# Patient Record
Sex: FEMALE | Race: WHITE | NOT HISPANIC OR LATINO | Employment: UNEMPLOYED | URBAN - METROPOLITAN AREA
[De-identification: names, ages, dates, MRNs, and addresses within clinical notes are randomized per-mention and may not be internally consistent; named-entity substitution may affect disease eponyms.]

---

## 2019-03-14 ENCOUNTER — OFFICE VISIT (OUTPATIENT)
Dept: OBSTETRICS AND GYNECOLOGY | Facility: CLINIC | Age: 44
End: 2019-03-14

## 2019-03-14 VITALS
HEART RATE: 74 BPM | HEIGHT: 64 IN | WEIGHT: 176 LBS | DIASTOLIC BLOOD PRESSURE: 73 MMHG | BODY MASS INDEX: 30.05 KG/M2 | SYSTOLIC BLOOD PRESSURE: 112 MMHG

## 2019-03-14 DIAGNOSIS — D25.2 INTRAMURAL AND SUBSEROUS LEIOMYOMA OF UTERUS: ICD-10-CM

## 2019-03-14 DIAGNOSIS — N92.0 MENORRHAGIA WITH REGULAR CYCLE: ICD-10-CM

## 2019-03-14 DIAGNOSIS — N94.6 DYSMENORRHEA: ICD-10-CM

## 2019-03-14 DIAGNOSIS — Z12.4 SCREENING FOR CERVICAL CANCER: ICD-10-CM

## 2019-03-14 DIAGNOSIS — Z71.6 ENCOUNTER FOR TOBACCO USE CESSATION COUNSELING: Primary | ICD-10-CM

## 2019-03-14 DIAGNOSIS — N89.8 VAGINAL DISCHARGE: ICD-10-CM

## 2019-03-14 DIAGNOSIS — D25.1 INTRAMURAL AND SUBSEROUS LEIOMYOMA OF UTERUS: ICD-10-CM

## 2019-03-14 PROCEDURE — 99214 OFFICE O/P EST MOD 30 MIN: CPT | Performed by: OBSTETRICS & GYNECOLOGY

## 2019-03-14 PROCEDURE — 99406 BEHAV CHNG SMOKING 3-10 MIN: CPT | Performed by: OBSTETRICS & GYNECOLOGY

## 2019-03-14 RX ORDER — SODIUM CHLORIDE 0.9 % (FLUSH) 0.9 %
3-10 SYRINGE (ML) INJECTION AS NEEDED
Status: CANCELLED | OUTPATIENT
Start: 2019-03-14

## 2019-03-14 RX ORDER — SODIUM CHLORIDE, SODIUM LACTATE, POTASSIUM CHLORIDE, CALCIUM CHLORIDE 600; 310; 30; 20 MG/100ML; MG/100ML; MG/100ML; MG/100ML
100 INJECTION, SOLUTION INTRAVENOUS CONTINUOUS
Status: CANCELLED | OUTPATIENT
Start: 2019-03-14

## 2019-03-14 RX ORDER — IPRATROPIUM BROMIDE AND ALBUTEROL SULFATE 2.5; .5 MG/3ML; MG/3ML
3 SOLUTION RESPIRATORY (INHALATION) ONCE
Status: CANCELLED | OUTPATIENT
Start: 2019-03-14

## 2019-03-14 RX ORDER — MELOXICAM 7.5 MG/1
TABLET ORAL
COMMUNITY
Start: 2019-02-26 | End: 2019-03-29

## 2019-03-14 RX ORDER — OMEPRAZOLE 40 MG/1
40 CAPSULE, DELAYED RELEASE ORAL DAILY
COMMUNITY
Start: 2019-01-08

## 2019-03-14 RX ORDER — SODIUM CHLORIDE 0.9 % (FLUSH) 0.9 %
3 SYRINGE (ML) INJECTION EVERY 12 HOURS SCHEDULED
Status: CANCELLED | OUTPATIENT
Start: 2019-03-14

## 2019-03-14 RX ORDER — ESCITALOPRAM OXALATE 10 MG/1
10 TABLET ORAL DAILY
COMMUNITY
Start: 2019-01-08 | End: 2021-03-30 | Stop reason: SDUPTHER

## 2019-03-14 RX ORDER — LANOLIN ALCOHOL/MO/W.PET/CERES
1 CREAM (GRAM) TOPICAL DAILY
COMMUNITY
End: 2020-12-22

## 2019-03-14 NOTE — PATIENT INSTRUCTIONS
Abdominal Hysterectomy  Abdominal hysterectomy is a surgical procedure to remove the womb (uterus). The uterus is the muscular organ that houses a developing baby. This surgery may be done if:  · You have cancer.  · You have growths (tumors or fibroids) in the uterus.  · You have long-term (chronic) pain.  · You are bleeding.  · Your uterus has slipped down into your vagina (uterine prolapse).  · You have a condition in which the tissue that lines the uterus grows outside of its normal location (endometriosis).  · You have an infection in your uterus.  · You are having problems with your menstrual cycle.    Depending on why you are having this procedure, you may also have other reproductive organs removed. These could include:  · The part of your vagina that connects with your uterus (cervix).  · The organs that make eggs (ovaries).  · The tubes that connect the ovaries to the uterus (fallopian tubes).    Tell a health care provider about:  · Any allergies you have.  · All medicines you are taking, including vitamins, herbs, eye drops, creams, and over-the-counter medicines.  · Any problems you or family members have had with anesthetic medicines.  · Any blood disorders you have.  · Any surgeries you have had.  · Any medical conditions you have.  · Whether you are pregnant or may be pregnant.  What are the risks?  Generally, this is a safe procedure. However, problems may occur, including:  · Bleeding.  · Infection.  · Allergic reactions to medicines or dyes.  · Damage to other structures or organs.  · Nerve injury.  · Decreased interest in sex or pain during sex.  · Blood clots that can break free and travel to your lungs.    What happens before the procedure?  Staying hydrated  Follow instructions from your health care provider about hydration, which may include:  · Up to 2 hours before the procedure - you may continue to drink clear liquids, such as water, clear fruit juice, black coffee, and plain tea    Eating  and drinking restrictions  Follow instructions from your health care provider about eating and drinking, which may include:  · 8 hours before the procedure - stop eating heavy meals or foods such as meat, fried foods, or fatty foods.  · 6 hours before the procedure - stop eating light meals or foods, such as toast or cereal.  · 6 hours before the procedure - stop drinking milk or drinks that contain milk.  · 2 hours before the procedure - stop drinking clear liquids.    Medicines  · Ask your health care provider about:  ? Changing or stopping your regular medicines. This is especially important if you are taking diabetes medicines or blood thinners.  ? Taking medicines such as aspirin and ibuprofen. These medicines can thin your blood. Do not take these medicines before your procedure if your health care provider instructs you not to.  · You may be given antibiotic medicine to help prevent infection. Take it as told by your health care provider.  · You may be asked to take laxatives to prevent constipation.  General instructions  · Ask your health care provider how your surgical site will be marked or identified.  · You may be asked to shower with a germ-killing soap.  · Plan to have someone take you home from the hospital.  · Do not use any products that contain nicotine or tobacco, such as cigarettes and e-cigarettes. If you need help quitting, ask your health care provider.  · You may have an exam or testing.  · You may have a blood or urine sample taken.  · You may need to have an enema to clean out your rectum and lower colon.  · This procedure can affect the way you feel about yourself. Talk to your health care provider about the physical and emotional changes this procedure may cause.  What happens during the procedure?  · To lower your risk of infection:  ? Your health care team will wash or sanitize their hands.  ? Your skin will be washed with soap.  ? Hair may be removed from the surgical area.  · An IV  tube will be inserted into one of your veins.  · You will be given one or more of the following:  ? A medicine to help you relax (sedative).  ? A medicine to make you fall asleep (general anesthetic).  · Tight-fitting (compression) stockings will be placed on your legs to promote circulation.  · A thin, flexible tube (catheter) will be inserted to help drain your urine.  · The surgeon will make a cut (incision) through the skin in your lower belly. The incision may go side-to-side or up-and-down.  · The surgeon will move aside the body tissue that covers your uterus. The surgeon will then carefully take out your uterus along with any of the other organs that need to be removed.  · Bleeding will be controlled with clamps or sutures.  · The surgeon will close your incision with stitches (sutures), skin glue, or adhesive strips.  · A bandage (dressing) will be placed over the incision.  The procedure may vary among health care providers and hospitals.  What happens after the procedure?  · You will be given pain medicine as needed.  · Your blood pressure, heart rate, breathing rate, and blood oxygen level will be monitored until the medicines you were given have worn off.  · You will need to stay in the hospital to recover for one to two days. Ask your health care provider how long you will need to stay in the hospital after your procedure.  · You may have a liquid diet at first. You will most likely return to your usual diet the day after surgery.  · You will still have the urinary catheter in place. It will likely be removed the day after surgery.  · You may have to wear compression stockings. These stockings help to prevent blood clots and reduce swelling in your legs.  · You will be encouraged to walk as soon as possible. You will also use a device or do breathing exercises to keep your lungs clear.  · You may need to use a sanitary napkin for vaginal discharge.  Summary  · Abdominal hysterectomy is a surgical  procedure to remove the womb (uterus). The uterus is the muscular organ that houses a developing baby.  · This procedure can affect the way you feel about yourself. Talk to your health care provider about the physical and emotional changes this procedure may cause.  · You will be given medicines for pain after the procedure.  · You will need to stay in the hospital to recover. Ask your health care provider how long you will need to stay in the hospital after your procedure.  This information is not intended to replace advice given to you by your health care provider. Make sure you discuss any questions you have with your health care provider.  Document Released: 12/23/2014 Document Revised: 12/06/2017 Document Reviewed: 12/06/2017  [employer] Interactive Patient Education © 2019 [employer]    Abdominal Hysterectomy, Care After  This sheet gives you information about how to care for yourself after your procedure. Your doctor may also give you more specific instructions. If you have problems or questions, contact your doctor.  Follow these instructions at home:  Bathing  · Do not take baths, swim, or use a hot tub until your doctor says it is okay. Ask your doctor if you can take showers. You may only be allowed to take sponge baths for bathing.  · Keep the bandage (dressing) dry until your doctor says it can be taken off.  Surgical cut (  incision) care  · Follow instructions from your doctor about how to take care of your cut from surgery. Make sure you:  ? Wash your hands with soap and water before you change your bandage (dressing). If you cannot use soap and water, use hand .  ? Change your bandage as told by your doctor.  ? Leave stitches (sutures), skin glue, or skin tape (adhesive) strips in place. They may need to stay in place for 2 weeks or longer. If tape strips get loose and curl up, you may trim the loose edges. Do not remove tape strips completely unless your doctor says it is okay.  · Check  your surgical cut area every day for signs of infection. Check for:  ? Redness, swelling, or pain.  ? Fluid or blood.  ? Warmth.  ? Pus or a bad smell.  Activity  · Do gentle, daily exercise as told by your doctor. You may be told to take short walks every day and go farther each time.  · Do not lift anything that is heavier than 10 lb (4.5 kg), or the limit that your doctor tells you, until he or she says that it is safe.  · Do not drive or use heavy machinery while taking prescription pain medicine.  · Do not drive for 24 hours if you were given a medicine to help you relax (sedative).  · Follow your doctor's advice about exercise, driving, and general activities. Ask your doctor what activities are safe for you.  Lifestyle  · Do not douche, use tampons, or have sex for at least 6 weeks or as told by your doctor.  · Do not drink alcohol until your doctor says it is okay.  · Drink enough fluid to keep your pee (urine) clear or pale yellow.  · Try to have someone at home with you for the first 1-2 weeks to help.  · Do not use any products that contain nicotine or tobacco, such as cigarettes and e-cigarettes. These can slow down healing. If you need help quitting, ask your doctor.  General instructions  · Take over-the-counter and prescription medicines only as told by your doctor.  · Do not take aspirin or ibuprofen. These medicines can cause bleeding.  · To prevent or treat constipation while you are taking prescription pain medicine, your doctor may suggest that you:  ? Drink enough fluid to keep your urine clear or pale yellow.  ? Take over-the-counter or prescription medicines.  ? Eat foods that are high in fiber, such as:  § Fresh fruits and vegetables.  § Whole grains.  § Beans.  ? Limit foods that are high in fat and processed sugars, such as fried and sweet foods.  · Keep all follow-up visits as told by your doctor. This is important.  Contact a doctor if:  · You have chills or fever.  · You have redness,  swelling, or pain around your cut.  · You have fluid or blood coming from your cut.  · Your cut feels warm to the touch.  · You have pus or a bad smell coming from your cut.  · Your cut breaks open.  · You feel dizzy or light-headed.  · You have pain or bleeding when you pee.  · You keep having watery poop (diarrhea).  · You keep feeling sick to your stomach (nauseous) or keep throwing up (vomiting).  · You have unusual fluid (discharge) coming from your vagina.  · You have a rash.  · You have a reaction to your medicine.  · Your pain medicine does not help.  Get help right away if:  · You have a fever and your symptoms get worse all of a sudden.  · You have very bad belly (abdominal) pain.  · You are short of breath.  · You pass out (faint).  · You have pain, swelling, or redness of your leg.  · You bleed a lot from your vagina and notice clumps of blood (clots).  Summary  · Do not take baths, swim, or use a hot tub until your doctor says it is okay. Ask your doctor if you can take showers. You may only be allowed to take sponge baths for bathing.  · Follow your doctor's advice about exercise, driving, and general activities. Ask your doctor what activities are safe for you.  · Do not lift anything that is heavier than 10 lb (4.5 kg), or the limit that your doctor tells you, until he or she says that it is safe.  · Try to have someone at home with you for the first 1-2 weeks to help.  This information is not intended to replace advice given to you by your health care provider. Make sure you discuss any questions you have with your health care provider.  Document Released: 09/26/2009 Document Revised: 12/06/2017 Document Reviewed: 12/06/2017  [employer] Interactive Patient Education © 2019 [employer]    For more information:    Quit Now [facility]  1-800-QUIT-NOW  https://[place].quitlogix.org/en-[place]/  Steps to Quit Smoking  Smoking tobacco can be harmful to your health and can affect almost every organ in your body.  Smoking puts you, and those around you, at risk for developing many serious chronic diseases. Quitting smoking is difficult, but it is one of the best things that you can do for your health. It is never too late to quit.  What are the benefits of quitting smoking?  When you quit smoking, you lower your risk of developing serious diseases and conditions, such as:  · Lung cancer or lung disease, such as COPD.  · Heart disease.  · Stroke.  · Heart attack.  · Infertility.  · Osteoporosis and bone fractures.  Additionally, symptoms such as coughing, wheezing, and shortness of breath may get better when you quit. You may also find that you get sick less often because your body is stronger at fighting off colds and infections. If you are pregnant, quitting smoking can help to reduce your chances of having a baby of low birth weight.  How do I get ready to quit?  When you decide to quit smoking, create a plan to make sure that you are successful. Before you quit:  · Pick a date to quit. Set a date within the next two weeks to give you time to prepare.  · Write down the reasons why you are quitting. Keep this list in places where you will see it often, such as on your bathroom mirror or in your car or wallet.  · Identify the people, places, things, and activities that make you want to smoke (triggers) and avoid them. Make sure to take these actions:  ¨ Throw away all cigarettes at home, at work, and in your car.  ¨ Throw away smoking accessories, such as ashtrays and lighters.  ¨ Clean your car and make sure to empty the ashtray.  ¨ Clean your home, including curtains and carpets.  · Tell your family, friends, and coworkers that you are quitting. Support from your loved ones can make quitting easier.  · Talk with your health care provider about your options for quitting smoking.  · Find out what treatment options are covered by your health insurance.  What strategies can I use to quit smoking?  Talk with your healthcare  provider about different strategies to quit smoking. Some strategies include:  · Quitting smoking altogether instead of gradually lessening how much you smoke over a period of time. Research shows that quitting “cold turkey” is more successful than gradually quitting.  · Attending in-person counseling to help you build problem-solving skills. You are more likely to have success in quitting if you attend several counseling sessions. Even short sessions of 10 minutes can be effective.  · Finding resources and support systems that can help you to quit smoking and remain smoke-free after you quit. These resources are most helpful when you use them often. They can include:  ¨ Online chats with a counselor.  ¨ Telephone quitlines.  ¨ Printed self-help materials.  ¨ Support groups or group counseling.  ¨ Text messaging programs.  ¨ Mobile phone applications.  · Taking medicines to help you quit smoking. (If you are pregnant or breastfeeding, talk with your health care provider first.) Some medicines contain nicotine and some do not. Both types of medicines help with cravings, but the medicines that include nicotine help to relieve withdrawal symptoms. Your health care provider may recommend:  ¨ Nicotine patches, gum, or lozenges.  ¨ Nicotine inhalers or sprays.  ¨ Non-nicotine medicine that is taken by mouth.  Talk with your health care provider about combining strategies, such as taking medicines while you are also receiving in-person counseling. Using these two strategies together makes you more likely to succeed in quitting than if you used either strategy on its own.  If you are pregnant or breastfeeding, talk with your health care provider about finding counseling or other support strategies to quit smoking. Do not take medicine to help you quit smoking unless told to do so by your health care provider.  What things can I do to make it easier to quit?  Quitting smoking might feel overwhelming at first, but there is a  lot that you can do to make it easier. Take these important actions:  · Reach out to your family and friends and ask that they support and encourage you during this time. Call telephone quitlines, reach out to support groups, or work with a counselor for support.  · Ask people who smoke to avoid smoking around you.  · Avoid places that trigger you to smoke, such as bars, parties, or smoke-break areas at work.  · Spend time around people who do not smoke.  · Lessen stress in your life, because stress can be a smoking trigger for some people. To lessen stress, try:  ¨ Exercising regularly.  ¨ Deep-breathing exercises.  ¨ Yoga.  ¨ Meditating.  ¨ Performing a body scan. This involves closing your eyes, scanning your body from head to toe, and noticing which parts of your body are particularly tense. Purposefully relax the muscles in those areas.  · Download or purchase mobile phone or tablet apps (applications) that can help you stick to your quit plan by providing reminders, tips, and encouragement. There are many free apps, such as QuitGuide from the CDC (Centers for Disease Control and Prevention). You can find other support for quitting smoking (smoking cessation) through smokefree.gov and other websites.  How will I feel when I quit smoking?  Within the first 24 hours of quitting smoking, you may start to feel some withdrawal symptoms. These symptoms are usually most noticeable 2-3 days after quitting, but they usually do not last beyond 2-3 weeks. Changes or symptoms that you might experience include:  · Mood swings.  · Restlessness, anxiety, or irritation.  · Difficulty concentrating.  · Dizziness.  · Strong cravings for sugary foods in addition to nicotine.  · Mild weight gain.  · Constipation.  · Nausea.  · Coughing or a sore throat.  · Changes in how your medicines work in your body.  · A depressed mood.  · Difficulty sleeping (insomnia).  After the first 2-3 weeks of quitting, you may start to notice more  positive results, such as:  · Improved sense of smell and taste.  · Decreased coughing and sore throat.  · Slower heart rate.  · Lower blood pressure.  · Clearer skin.  · The ability to breathe more easily.  · Fewer sick days.  Quitting smoking is very challenging for most people. Do not get discouraged if you are not successful the first time. Some people need to make many attempts to quit before they achieve long-term success. Do your best to stick to your quit plan, and talk with your health care provider if you have any questions or concerns.  This information is not intended to replace advice given to you by your health care provider. Make sure you discuss any questions you have with your health care provider.  Document Released: 12/12/2002 Document Revised: 08/15/2017 Document Reviewed: 05/03/2016  [employer] Interactive Patient Education © 2017 [employer]

## 2019-03-14 NOTE — PROGRESS NOTES
Subjective   Candi Valerio is a 43 y.o. female.   CC: pt here for fibroids.   History of Present Illness   Patient last saw me about 3 years ago.  At the time she was noted to have significant uterine fibroids.  She had a dominant fibroid that was at least 7 cm in size at that time.  Patient was having at that time mostly symptoms with stress urinary incontinence.  Patient was referred to urogynecology who had recommended proceeding with hysterectomy and mid urethral sling placement.  It seems that unfortunately her insurance company would not cover the hysterectomy at the time, so she had only the mid urethral sling placement.  She reports that initially her stress urinary incontinence was improved, but she is now having issues with leakage of urine again.  Her biggest concern however at this time is with painful and heavy periods.  She reports that her periods last 5 days.  She says they are extremely heavy the first 2-3 days.  She reports that she goes through about 10 pads per day.  She says the pain during her menses is excruciating.  Recently, she was also having some left-sided back and flank pain for which she went to the emergency room.  CT scan of the time only noted a markedly enlarged fibroid uterus.  She has not had a recent ultrasound.  Her last Pap smear was 3 years ago and was normal.  The patient wishes to move forward with definitive surgical therapy again with hysterectomy, this time more because of her painful and heavy periods.    Past Surgical History:   Procedure Laterality Date   • D&C WITH SUCTION     • INCONTINENCE SURGERY     • TUBAL ABDOMINAL LIGATION       Obstetric History       T0      L3     SAB1   TAB0   Ectopic0   Molar0   Multiple0   Live Births0       # Outcome Date GA Lbr Israel/2nd Weight Sex Delivery Anes PTL Lv   4 SAB            3   35w0d   F       2   35w0d   F       1   35w0d   M Vag-Spont             Past Medical History:   Diagnosis Date   •  "Abnormal Pap smear of cervix    • Anxiety      Current Outpatient Medications on File Prior to Visit   Medication Sig   • Diclofenac Sodium (PENNSAID) 2 % solution Place  on the skin as directed by provider.   • escitalopram (LEXAPRO) 10 MG tablet Take 10 mg by mouth Daily.   • omeprazole (priLOSEC) 40 MG capsule Take 40 mg by mouth Daily.   • Biotin 300 MCG tablet Take  by mouth.   • meloxicam (MOBIC) 7.5 MG tablet      No current facility-administered medications on file prior to visit.      Allergies   Allergen Reactions   • Codeine Swelling   • Adhesive Tape Rash       The following portions of the patient's history were reviewed and updated as appropriate: allergies, current medications, past family history, past medical history, past social history, past surgical history and problem list.    Review of Systems  General: No fever or chills  Constitutional: No weight loss or gain, no hair loss  HENT: No headache, no hearing loss, no tinnitus  Eyes: normal vision, no eye pain  Lungs: No cough, no shortness of breath  Heart: No chest pain, no palpitations  Abdomen: No nausea, vomiting, constipation or diarrhea  : No dysuria, no hematuria  Skin: No rashes  Lymph: No swelling  Neuro: No parathesia, no weakness  Psych: Normal though content, no hallucinations, no SI/HI    Objective   Physical Exam  Vitals:    03/14/19 0810   BP: 112/73   Pulse: 74   Weight: 79.8 kg (176 lb)   Height: 162.6 cm (64\")     Patient's last menstrual period was 02/25/2019 (approximate).    Gen: No acute distress, awake and oriented times three  Abdomen: soft, nontender, no masses or hernia, no hepatosplenomegaly, non distended, normoactive bowel sounds  Pelvic: Exam performed in the presence of a female chaperone  Patient has provided verbal consent to proceed with exam.  Normal external female genitalia, no lesions  Urethra: Normal meatus, no caruncle  Bladder: nontender  Vagina: No blood; moderate amount thin white DC  Cervix: No " cervical motion tenderness, no lesions, no active bleeding, nonfriable  Uterus: Anteverted, markedly enlarged about 16-18 weeks size, mildly tender, irregular borders consistent with fibroid uterus  Adnexa: No adnexal tenderness, but evaluation for adnexal masses is limited due to the size of the massively enlarged uterus  External anal exam: Normal appearance, no lesions or hemorrhoids  Rectal: Deferred  Psych: Good judgement and insight, normal affect and mood        Assessment/Plan   Diagnoses and all orders for this visit:    Encounter for tobacco use cessation counseling  I advised Candi of the risks of continuing to use tobacco, and I provided her with tobacco cessation educational materials in the After Visit Summary.  We discussed the risks of continuing to smoke in the perioperative setting including increased risk of surgical complications and postoperative complications such as wound infections, wound breakdown, DVT, pneumonia, anesthesia comp occasions, etc.    During this visit, I spent greater than 3 minutes counseling the patient regarding tobacco cessation.  Screening for cervical cancer  -     IGP, Apt HPV,rfx 16 / 18,45  Patient is due for a Pap smear and especially since she is planning hysterectomy she needs to have this done.  Pap smear performed today.  Vaginal discharge  -     NuSwab VG+ - Swab, Vagina    Menorrhagia with regular cycle    Dysmenorrhea    Intramural and subserous leiomyoma of uterus  -     Case Request; Standing  -     CBC (No Diff); Future  -     hCG, Serum, QUALITATIVE; Future  -     XR Chest 2 View; Future  -     lactated ringers infusion; Infuse 100 mL/hr into a venous catheter Continuous.  -     ceFAZolin (ANCEF) 2 g in sodium chloride 0.9 % 100 mL IVPB; Infuse 2 g into a venous catheter 1 (One) Time.  -     sodium chloride 0.9 % flush 3 mL; Infuse 3 mL into a venous catheter Every 12 (Twelve) Hours.  -     sodium chloride 0.9 % flush 3-10 mL; Infuse 3-10 mL into a venous  catheter As Needed for Line Care.  -     ipratropium-albuterol (DUO-NEB) nebulizer solution 3 mL; Take 3 mL by nebulization 1 (One) Time.  -     Case Request    Other orders  -     Follow Anesthesia Guidelines / Standing Orders; Future  -     Obtain Informed Consent; Future  -     Provide NPO Instructions to Patient; Future  -     Follow Anesthesia Guidelines / Standing Orders; Standing  -     Verify / Perform Chlorhexidine Skin Prep if Indicated (If Not Already Completed); Standing  -     Inpatient Admission; Standing  -     Chlorhexidine Skin Prep; Future  -     Verify NPO Status; Standing  -     SCD (Sequential Compression Device) - Place on Patient in Pre-Op; Standing  -     Instructions on Coughing, Deep Breathing & Incentive Spirometry; Standing  -     Type & Screen; Standing  -     Insert Peripheral IV; Standing  -     Saline Lock & Maintain IV Access; Standing    Patient is a markedly enlarged fibroid uterus on exam today.  She reports symptoms secondary to these fibroids including heavy periods and extremely painful periods.  She has been taking NSAIDs with little relief in her symptoms.  I feel that her fibroids are advanced to the point that medical therapy would be unlikely to be effective in this patient.  Furthermore, the size of her uterus is greater than that which can accommodate an endometrial ablation.  As such, I feel her only reasonable course is to proceed with hysterectomy.  The risks, benefits, and alternatives were discussed with the patient.  This would likely need to be an open/abdominal approach given the size of her uterus.  Typical recovery process is discussed with the patient.  She verbalizes understanding and wishes to proceed with hysterectomy.  We will try to schedule for 4/17/19.  She should return in 2 weeks for preoperative appointment

## 2019-03-18 ENCOUNTER — TELEPHONE (OUTPATIENT)
Dept: OBSTETRICS AND GYNECOLOGY | Facility: CLINIC | Age: 44
End: 2019-03-18

## 2019-03-18 DIAGNOSIS — N76.0 BACTERIAL VAGINOSIS: Primary | ICD-10-CM

## 2019-03-18 DIAGNOSIS — B96.89 BACTERIAL VAGINOSIS: Primary | ICD-10-CM

## 2019-03-18 LAB
A VAGINAE DNA VAG QL NAA+PROBE: ABNORMAL SCORE
BVAB2 DNA VAG QL NAA+PROBE: ABNORMAL SCORE
C ALBICANS DNA VAG QL NAA+PROBE: NEGATIVE
C GLABRATA DNA VAG QL NAA+PROBE: NEGATIVE
C TRACH RRNA SPEC QL NAA+PROBE: NEGATIVE
CYTOLOGIST CVX/VAG CYTO: NORMAL
CYTOLOGY CVX/VAG DOC THIN PREP: NORMAL
DX ICD CODE: NORMAL
HIV 1 & 2 AB SER-IMP: NORMAL
HPV I/H RISK 4 DNA CVX QL PROBE+SIG AMP: NEGATIVE
MEGA1 DNA VAG QL NAA+PROBE: ABNORMAL SCORE
N GONORRHOEA RRNA SPEC QL NAA+PROBE: NEGATIVE
OTHER STN SPEC: NORMAL
PATH REPORT.FINAL DX SPEC: NORMAL
STAT OF ADQ CVX/VAG CYTO-IMP: NORMAL
T VAGINALIS RRNA SPEC QL NAA+PROBE: NEGATIVE

## 2019-03-18 RX ORDER — METRONIDAZOLE 500 MG/1
500 TABLET ORAL 2 TIMES DAILY
Qty: 14 TABLET | Refills: 0 | Status: SHIPPED | OUTPATIENT
Start: 2019-03-18 | End: 2019-03-25

## 2019-03-18 NOTE — TELEPHONE ENCOUNTER
Pt aware. MARJAN      ----- Message from Curtis Ventura MD sent at 3/18/2019  2:34 PM EDT -----  Let the patient know that her cultures revealed bacterial vaginosis.  I sent in a prescription for metronidazole.  She should avoid alcohol while taking this medication.  You can also let her know that her Pap smear was normal

## 2019-03-19 ENCOUNTER — RESULTS ENCOUNTER (OUTPATIENT)
Dept: OBSTETRICS AND GYNECOLOGY | Facility: CLINIC | Age: 44
End: 2019-03-19

## 2019-03-19 DIAGNOSIS — D25.1 INTRAMURAL AND SUBSEROUS LEIOMYOMA OF UTERUS: ICD-10-CM

## 2019-03-19 DIAGNOSIS — D25.2 INTRAMURAL AND SUBSEROUS LEIOMYOMA OF UTERUS: ICD-10-CM

## 2019-03-29 ENCOUNTER — OFFICE VISIT (OUTPATIENT)
Dept: OBSTETRICS AND GYNECOLOGY | Facility: CLINIC | Age: 44
End: 2019-03-29

## 2019-03-29 VITALS
HEART RATE: 87 BPM | BODY MASS INDEX: 29.71 KG/M2 | WEIGHT: 174 LBS | SYSTOLIC BLOOD PRESSURE: 119 MMHG | HEIGHT: 64 IN | DIASTOLIC BLOOD PRESSURE: 74 MMHG

## 2019-03-29 DIAGNOSIS — D25.1 INTRAMURAL AND SUBSEROUS LEIOMYOMA OF UTERUS: Primary | ICD-10-CM

## 2019-03-29 DIAGNOSIS — N94.6 DYSMENORRHEA: ICD-10-CM

## 2019-03-29 DIAGNOSIS — D25.2 INTRAMURAL AND SUBSEROUS LEIOMYOMA OF UTERUS: Primary | ICD-10-CM

## 2019-03-29 DIAGNOSIS — N92.0 MENORRHAGIA WITH REGULAR CYCLE: ICD-10-CM

## 2019-03-29 PROBLEM — R10.32 COLICKY LLQ ABDOMINAL PAIN: Status: ACTIVE | Noted: 2019-03-29

## 2019-03-29 PROCEDURE — 99214 OFFICE O/P EST MOD 30 MIN: CPT | Performed by: OBSTETRICS & GYNECOLOGY

## 2019-03-29 NOTE — PROGRESS NOTES
Subjective   Candi Valerio is a 43 y.o. female.   Cc: pt here for pre-op.   History of Present Illness   Patient is here for preop for planned total abdominal hysterectomy.  She has large bulky fibroids.  She had initially been evaluated for hysterectomy about 3 years ago, but the procedure was not approved by her insurance company.  She did end up having mid urethral sling for stress urinary incontinence which has helped some, but she still has episodes of stress incontinence.  She reports periods that are very heavy and painful.  She would like to proceed now with definitive surgical management with hysterectomy.  Patient is still smoking, which she states she is trying to quit at least 2 weeks before her surgery.      Current Outpatient Medications:   •  Biotin 300 MCG tablet, Take  by mouth., Disp: , Rfl:   •  Diclofenac Sodium (PENNSAID) 2 % solution, Place  on the skin as directed by provider., Disp: , Rfl:   •  escitalopram (LEXAPRO) 10 MG tablet, Take 10 mg by mouth Daily., Disp: , Rfl:   •  omeprazole (priLOSEC) 40 MG capsule, Take 40 mg by mouth Daily., Disp: , Rfl:     Allergies   Allergen Reactions   • Codeine Swelling   • Adhesive Tape Rash       Past Surgical History:   Procedure Laterality Date   • D&C WITH SUCTION     • INCONTINENCE SURGERY     • TUBAL ABDOMINAL LIGATION       Past Medical History:   Diagnosis Date   • Abnormal Pap smear of cervix    • Anxiety        The following portions of the patient's history were reviewed and updated as appropriate: allergies, current medications, past family history, past medical history, past social history, past surgical history and problem list.    Review of Systems  General: No fever or chills  Constitutional: No weight loss or gain, no hair loss  HENT: No headache, no hearing loss, no tinnitus  Eyes: normal vision, no eye pain  Lungs: No cough, no shortness of breath  Heart: No chest pain, no palpitations  Abdomen: No nausea, vomiting, constipation or  "diarrhea  : No dysuria, no hematuria  Skin: No rashes  Lymph: No swelling  Neuro: No parathesia, no weakness  Psych: Normal though content, no hallucinations, no SI/HI      Objective   Physical Exam  Vitals:    03/29/19 0846   BP: 119/74   Pulse: 87   Weight: 78.9 kg (174 lb)   Height: 162.6 cm (64\")       General: No acute distress, awake and oriented x3  Abdomen: Soft, nontender, nondistended  Psychiatric: Good judgment and insight, normal affect and mood  Neurological cranial nerves II through XII intact, no deficits        Assessment/Plan   Diagnoses and all orders for this visit:    Intramural and subserous leiomyoma of uterus    Dysmenorrhea    Menorrhagia with regular cycle      Patient with grossly enlarged uterus with significant size fibroids.  On exam it is roughly 16-18 weeks in size and bulky.  Size of the uterus precludes laparoscopic approach.  Patient will need open approach.  Surgical risks were discussed with the patient at length today.  Preoperative instructions are given to the patient.  Typical recovery process is explained.  Patient will still preop in the hospital on 4/12.  Discussed plans for surgery today.  Surgical consent signed today.  All questions answered.    I spent 22 out of 25 minutes with the patient in face to face counseling of the above issues.           "

## 2019-04-12 ENCOUNTER — HOSPITAL ENCOUNTER (OUTPATIENT)
Dept: GENERAL RADIOLOGY | Facility: HOSPITAL | Age: 44
Discharge: HOME | End: 2019-04-12
Admitting: OBSTETRICS & GYNECOLOGY

## 2019-04-12 ENCOUNTER — APPOINTMENT (OUTPATIENT)
Dept: PREADMISSION TESTING | Facility: HOSPITAL | Age: 44
End: 2019-04-12

## 2019-04-12 VITALS
TEMPERATURE: 98.5 F | RESPIRATION RATE: 20 BRPM | OXYGEN SATURATION: 97 % | BODY MASS INDEX: 29.88 KG/M2 | SYSTOLIC BLOOD PRESSURE: 121 MMHG | HEART RATE: 82 BPM | WEIGHT: 175 LBS | DIASTOLIC BLOOD PRESSURE: 69 MMHG | HEIGHT: 64 IN

## 2019-04-12 DIAGNOSIS — D25.2 INTRAMURAL AND SUBSEROUS LEIOMYOMA OF UTERUS: ICD-10-CM

## 2019-04-12 DIAGNOSIS — D25.1 INTRAMURAL AND SUBSEROUS LEIOMYOMA OF UTERUS: ICD-10-CM

## 2019-04-12 LAB
ANION GAP SERPL CALCULATED.3IONS-SCNC: 12.8 MMOL/L
BUN BLD-MCNC: 11 MG/DL (ref 6–20)
BUN/CREAT SERPL: 14.1 (ref 7–25)
CALCIUM SPEC-SCNC: 9.7 MG/DL (ref 8.6–10.5)
CHLORIDE SERPL-SCNC: 102 MMOL/L (ref 98–107)
CO2 SERPL-SCNC: 25.2 MMOL/L (ref 22–29)
CREAT BLD-MCNC: 0.78 MG/DL (ref 0.57–1)
DEPRECATED RDW RBC AUTO: 49.3 FL (ref 37–54)
ERYTHROCYTE [DISTWIDTH] IN BLOOD BY AUTOMATED COUNT: 13.4 % (ref 12.3–15.4)
GFR SERPL CREATININE-BSD FRML MDRD: 81 ML/MIN/1.73
GLUCOSE BLD-MCNC: 102 MG/DL (ref 65–99)
HCG SERPL QL: NEGATIVE
HCT VFR BLD AUTO: 42.4 % (ref 34–46.6)
HGB BLD-MCNC: 13.5 G/DL (ref 12–15.9)
MCH RBC QN AUTO: 31.3 PG (ref 26.6–33)
MCHC RBC AUTO-ENTMCNC: 31.8 G/DL (ref 31.5–35.7)
MCV RBC AUTO: 98.4 FL (ref 79–97)
PLATELET # BLD AUTO: 314 10*3/MM3 (ref 140–450)
PMV BLD AUTO: 10.9 FL (ref 6–12)
POTASSIUM BLD-SCNC: 4.8 MMOL/L (ref 3.5–5.2)
RBC # BLD AUTO: 4.31 10*6/MM3 (ref 3.77–5.28)
SODIUM BLD-SCNC: 140 MMOL/L (ref 136–145)
WBC NRBC COR # BLD: 8.69 10*3/MM3 (ref 3.4–10.8)

## 2019-04-12 PROCEDURE — 93010 ELECTROCARDIOGRAM REPORT: CPT | Performed by: INTERNAL MEDICINE

## 2019-04-12 PROCEDURE — 85027 COMPLETE CBC AUTOMATED: CPT | Performed by: OBSTETRICS & GYNECOLOGY

## 2019-04-12 PROCEDURE — 80048 BASIC METABOLIC PNL TOTAL CA: CPT | Performed by: OBSTETRICS & GYNECOLOGY

## 2019-04-12 PROCEDURE — 84703 CHORIONIC GONADOTROPIN ASSAY: CPT | Performed by: OBSTETRICS & GYNECOLOGY

## 2019-04-12 PROCEDURE — 71046 X-RAY EXAM CHEST 2 VIEWS: CPT

## 2019-04-12 PROCEDURE — 36415 COLL VENOUS BLD VENIPUNCTURE: CPT | Performed by: OBSTETRICS & GYNECOLOGY

## 2019-04-12 PROCEDURE — 93005 ELECTROCARDIOGRAM TRACING: CPT

## 2019-04-12 RX ORDER — LOTEPREDNOL ETABONATE 5 MG/ML
4 SUSPENSION/ DROPS OPHTHALMIC DAILY
COMMUNITY
End: 2019-05-16

## 2019-04-12 NOTE — DISCHARGE INSTRUCTIONS
Take the following medications the morning of surgery with a small sip of water:        General Instructions:  • Do not eat solid food after midnight the night before surgery.  • You may drink clear liquids day of surgery but must stop at least one hour before your hospital arrival time.  • It is beneficial for you to have a clear drink that contains carbohydrates the day of surgery.  We suggest a 12 to 20 ounce bottle of Gatorade or Powerade for non-diabetic patients or a 12 to 20 ounce bottle of G2 or Powerade Zero for diabetic patients. (Pediatric patients, are not advised to drink a 12 to 20 ounce carbohydrate drink)    Clear liquids are liquids you can see through.  Nothing red in color.     Plain water                               Sports drinks  Sodas                                   Gelatin (Jell-O)  Fruit juices without pulp such as white grape juice and apple juice  Popsicles that contain no fruit or yogurt  Tea or coffee (no cream or milk added)  Gatorade / Powerade  G2 / Powerade Zero    • Infants may have breast milk up to four hours before surgery.  • Infants drinking formula may drink formula up to six hours before surgery.   • Patients who avoid smoking, chewing tobacco and alcohol for 4 weeks prior to surgery have a reduced risk of post-operative complications.  Quit smoking as many days before surgery as you can.  • Do not smoke, use chewing tobacco or drink alcohol the day of surgery.   • If applicable bring your C-PAP/ BI-PAP machine.  • Bring any papers given to you in the doctor’s office.  • Wear clean comfortable clothes and socks.  • Do not wear contact lenses, false eyelashes or make-up.  Bring a case for your glasses.   • Bring crutches or walker if applicable.  • Remove all piercings.  Leave jewelry and any other valuables at home.  • Hair extensions with metal clips must be removed prior to surgery.  • The Pre-Admission Testing nurse will instruct you to bring medications if unable to  obtain an accurate list in Pre-Admission Testing.        If you were given a blood bank ID arm band remember to bring it with you the day of surgery.    Preventing a Surgical Site Infection:  • For 2 to 3 days before surgery, avoid shaving with a razor because the razor can irritate skin and make it easier to develop an infection.    • Any areas of open skin can increase the risk of a post-operative wound infection by allowing bacteria to enter and travel throughout the body.  Notify your surgeon if you have any skin wounds / rashes even if it is not near the expected surgical site.  The area will need assessed to determine if surgery should be delayed until it is healed.  • The night prior to surgery sleep in a clean bed with clean clothing.  Do not allow pets to sleep with you.  • Shower on the morning of surgery using a fresh bar of anti-bacterial soap (such as Dial) and clean washcloth.  Dry with a clean towel and dress in clean clothing.  • Ask your surgeon if you will be receiving antibiotics prior to surgery.  • Make sure you, your family, and all healthcare providers clean their hands with soap and water or an alcohol based hand  before caring for you or your wound.    Day of surgery:  Upon arrival, a Pre-op nurse and Anesthesiologist will review your health history, obtain vital signs, and answer questions you may have.  The only belongings needed at this time will be your home medications and if applicable your C-PAP/BI-PAP machine.  If you are staying overnight your family can leave the rest of your belongings in the car and bring them to your room later.  A Pre-op nurse will start an IV and you may receive medication in preparation for surgery, including something to help you relax.  Your family will be able to see you in the Pre-op area.  While you are in surgery your family should notify the waiting room  if they leave the waiting room area and provide a contact phone  number.    Please be aware that surgery does come with discomfort.  We want to make every effort to control your discomfort so please discuss any uncontrolled symptoms with your nurse.   Your doctor will most likely have prescribed pain medications.      If you are going home after surgery you will receive individualized written care instructions before being discharged.  A responsible adult must drive you to and from the hospital on the day of your surgery and stay with you for 24 hours.    If you are staying overnight following surgery, you will be transported to your hospital room following the recovery period.  [facility] has all private rooms.    You have received a list of surgical assistants for your reference.  If you have any questions please call Pre-Admission Testing at [phone].  Deductibles and co-payments are collected on the day of service. Please be prepared to pay the required co-pay, deductible or deposit on the day of service as defined by your plan.

## 2019-04-16 ENCOUNTER — TELEPHONE (OUTPATIENT)
Dept: OBSTETRICS AND GYNECOLOGY | Facility: CLINIC | Age: 44
End: 2019-04-16

## 2019-04-16 NOTE — TELEPHONE ENCOUNTER
Left message to confirm with patient:     Surgery time has been moved to approx. 9:30 AM. You will not need to arrive at [facility] until 7:30 AM.

## 2019-04-17 ENCOUNTER — ANESTHESIA EVENT (OUTPATIENT)
Dept: PERIOP | Facility: HOSPITAL | Age: 44
End: 2019-04-17

## 2019-04-17 ENCOUNTER — HOSPITAL ENCOUNTER (INPATIENT)
Facility: HOSPITAL | Age: 44
LOS: 2 days | Discharge: HOME | End: 2019-04-19
Attending: OBSTETRICS & GYNECOLOGY | Admitting: OBSTETRICS & GYNECOLOGY

## 2019-04-17 ENCOUNTER — ANESTHESIA (OUTPATIENT)
Dept: PERIOP | Facility: HOSPITAL | Age: 44
End: 2019-04-17

## 2019-04-17 DIAGNOSIS — D25.2 INTRAMURAL AND SUBSEROUS LEIOMYOMA OF UTERUS: ICD-10-CM

## 2019-04-17 DIAGNOSIS — D25.1 INTRAMURAL AND SUBSEROUS LEIOMYOMA OF UTERUS: ICD-10-CM

## 2019-04-17 PROBLEM — D21.9 FIBROIDS: Status: ACTIVE | Noted: 2019-04-17

## 2019-04-17 LAB
ABO GROUP BLD: NORMAL
BLD GP AB SCN SERPL QL: NEGATIVE
RH BLD: POSITIVE
T&S EXPIRATION DATE: NORMAL

## 2019-04-17 PROCEDURE — 25010000002 DEXAMETHASONE PER 1 MG: Performed by: NURSE ANESTHETIST, CERTIFIED REGISTERED

## 2019-04-17 PROCEDURE — 88307 TISSUE EXAM BY PATHOLOGIST: CPT | Performed by: OBSTETRICS & GYNECOLOGY

## 2019-04-17 PROCEDURE — 0UT00ZZ RESECTION OF RIGHT OVARY, OPEN APPROACH: ICD-10-PCS | Performed by: OBSTETRICS & GYNECOLOGY

## 2019-04-17 PROCEDURE — 25010000003 CEFAZOLIN IN DEXTROSE 2-4 GM/100ML-% SOLUTION: Performed by: OBSTETRICS & GYNECOLOGY

## 2019-04-17 PROCEDURE — 58180 PARTIAL HYSTERECTOMY: CPT | Performed by: OBSTETRICS & GYNECOLOGY

## 2019-04-17 PROCEDURE — 25010000002 PROPOFOL 10 MG/ML EMULSION: Performed by: NURSE ANESTHETIST, CERTIFIED REGISTERED

## 2019-04-17 PROCEDURE — S0260 H&P FOR SURGERY: HCPCS | Performed by: OBSTETRICS & GYNECOLOGY

## 2019-04-17 PROCEDURE — 86901 BLOOD TYPING SEROLOGIC RH(D): CPT | Performed by: OBSTETRICS & GYNECOLOGY

## 2019-04-17 PROCEDURE — 25010000002 NEOSTIGMINE PER 0.5 MG: Performed by: NURSE ANESTHETIST, CERTIFIED REGISTERED

## 2019-04-17 PROCEDURE — 25010000002 KETOROLAC TROMETHAMINE PER 15 MG

## 2019-04-17 PROCEDURE — 25010000002 FENTANYL CITRATE (PF) 100 MCG/2ML SOLUTION: Performed by: NURSE ANESTHETIST, CERTIFIED REGISTERED

## 2019-04-17 PROCEDURE — 25010000002 PHENYLEPHRINE PER 1 ML: Performed by: NURSE ANESTHETIST, CERTIFIED REGISTERED

## 2019-04-17 PROCEDURE — 86900 BLOOD TYPING SEROLOGIC ABO: CPT | Performed by: OBSTETRICS & GYNECOLOGY

## 2019-04-17 PROCEDURE — 86850 RBC ANTIBODY SCREEN: CPT | Performed by: OBSTETRICS & GYNECOLOGY

## 2019-04-17 PROCEDURE — 25010000002 ONDANSETRON PER 1 MG: Performed by: NURSE ANESTHETIST, CERTIFIED REGISTERED

## 2019-04-17 PROCEDURE — 0UT70ZZ RESECTION OF BILATERAL FALLOPIAN TUBES, OPEN APPROACH: ICD-10-PCS | Performed by: OBSTETRICS & GYNECOLOGY

## 2019-04-17 PROCEDURE — 25010000002 KETOROLAC TROMETHAMINE PER 15 MG: Performed by: OBSTETRICS & GYNECOLOGY

## 2019-04-17 PROCEDURE — 25010000002 HYDROMORPHONE PER 4 MG: Performed by: NURSE ANESTHETIST, CERTIFIED REGISTERED

## 2019-04-17 PROCEDURE — 25010000002 MIDAZOLAM PER 1 MG: Performed by: NURSE ANESTHETIST, CERTIFIED REGISTERED

## 2019-04-17 PROCEDURE — 94640 AIRWAY INHALATION TREATMENT: CPT

## 2019-04-17 PROCEDURE — 25010000002 MIDAZOLAM PER 1 MG: Performed by: ANESTHESIOLOGY

## 2019-04-17 PROCEDURE — 0TJB8ZZ INSPECTION OF BLADDER, VIA NATURAL OR ARTIFICIAL OPENING ENDOSCOPIC: ICD-10-PCS | Performed by: OBSTETRICS & GYNECOLOGY

## 2019-04-17 PROCEDURE — 0UT90ZL RESECTION OF UTERUS, SUPRACERVICAL, OPEN APPROACH: ICD-10-PCS | Performed by: OBSTETRICS & GYNECOLOGY

## 2019-04-17 RX ORDER — KETOROLAC TROMETHAMINE 30 MG/ML
15 INJECTION, SOLUTION INTRAMUSCULAR; INTRAVENOUS EVERY 6 HOURS
Status: COMPLETED | OUTPATIENT
Start: 2019-04-17 | End: 2019-04-18

## 2019-04-17 RX ORDER — EPHEDRINE SULFATE 50 MG/ML
5 INJECTION, SOLUTION INTRAVENOUS ONCE AS NEEDED
Status: DISCONTINUED | OUTPATIENT
Start: 2019-04-17 | End: 2019-04-17 | Stop reason: HOSPADM

## 2019-04-17 RX ORDER — DIPHENHYDRAMINE HCL 25 MG
25 CAPSULE ORAL
Status: DISCONTINUED | OUTPATIENT
Start: 2019-04-17 | End: 2019-04-17 | Stop reason: HOSPADM

## 2019-04-17 RX ORDER — OXYCODONE AND ACETAMINOPHEN 10; 325 MG/1; MG/1
1 TABLET ORAL EVERY 4 HOURS PRN
Status: DISCONTINUED | OUTPATIENT
Start: 2019-04-17 | End: 2019-04-19 | Stop reason: HOSPADM

## 2019-04-17 RX ORDER — OXYCODONE AND ACETAMINOPHEN 7.5; 325 MG/1; MG/1
1 TABLET ORAL ONCE AS NEEDED
Status: DISCONTINUED | OUTPATIENT
Start: 2019-04-17 | End: 2019-04-17 | Stop reason: HOSPADM

## 2019-04-17 RX ORDER — PANTOPRAZOLE SODIUM 40 MG/1
40 TABLET, DELAYED RELEASE ORAL EVERY MORNING
Status: DISCONTINUED | OUTPATIENT
Start: 2019-04-18 | End: 2019-04-19 | Stop reason: HOSPADM

## 2019-04-17 RX ORDER — OXYCODONE HYDROCHLORIDE AND ACETAMINOPHEN 5; 325 MG/1; MG/1
1 TABLET ORAL EVERY 4 HOURS PRN
Status: DISCONTINUED | OUTPATIENT
Start: 2019-04-17 | End: 2019-04-19 | Stop reason: HOSPADM

## 2019-04-17 RX ORDER — FENTANYL CITRATE 50 UG/ML
INJECTION, SOLUTION INTRAMUSCULAR; INTRAVENOUS AS NEEDED
Status: DISCONTINUED | OUTPATIENT
Start: 2019-04-17 | End: 2019-04-17 | Stop reason: SURG

## 2019-04-17 RX ORDER — LIDOCAINE HYDROCHLORIDE 20 MG/ML
INJECTION, SOLUTION INFILTRATION; PERINEURAL AS NEEDED
Status: DISCONTINUED | OUTPATIENT
Start: 2019-04-17 | End: 2019-04-17 | Stop reason: SURG

## 2019-04-17 RX ORDER — PROMETHAZINE HYDROCHLORIDE 25 MG/ML
12.5 INJECTION, SOLUTION INTRAMUSCULAR; INTRAVENOUS ONCE AS NEEDED
Status: DISCONTINUED | OUTPATIENT
Start: 2019-04-17 | End: 2019-04-17 | Stop reason: HOSPADM

## 2019-04-17 RX ORDER — MORPHINE SULFATE 2 MG/ML
2 INJECTION, SOLUTION INTRAMUSCULAR; INTRAVENOUS EVERY 4 HOURS PRN
Status: DISCONTINUED | OUTPATIENT
Start: 2019-04-17 | End: 2019-04-18

## 2019-04-17 RX ORDER — MAGNESIUM HYDROXIDE 1200 MG/15ML
LIQUID ORAL AS NEEDED
Status: DISCONTINUED | OUTPATIENT
Start: 2019-04-17 | End: 2019-04-17 | Stop reason: HOSPADM

## 2019-04-17 RX ORDER — HYDROMORPHONE HYDROCHLORIDE 1 MG/ML
0.5 INJECTION, SOLUTION INTRAMUSCULAR; INTRAVENOUS; SUBCUTANEOUS
Status: DISCONTINUED | OUTPATIENT
Start: 2019-04-17 | End: 2019-04-17 | Stop reason: HOSPADM

## 2019-04-17 RX ORDER — ROCURONIUM BROMIDE 10 MG/ML
INJECTION, SOLUTION INTRAVENOUS AS NEEDED
Status: DISCONTINUED | OUTPATIENT
Start: 2019-04-17 | End: 2019-04-17 | Stop reason: SURG

## 2019-04-17 RX ORDER — HYDRALAZINE HYDROCHLORIDE 20 MG/ML
5 INJECTION INTRAMUSCULAR; INTRAVENOUS
Status: DISCONTINUED | OUTPATIENT
Start: 2019-04-17 | End: 2019-04-17 | Stop reason: HOSPADM

## 2019-04-17 RX ORDER — PROPOFOL 10 MG/ML
VIAL (ML) INTRAVENOUS AS NEEDED
Status: DISCONTINUED | OUTPATIENT
Start: 2019-04-17 | End: 2019-04-17 | Stop reason: SURG

## 2019-04-17 RX ORDER — NALOXONE HCL 0.4 MG/ML
0.2 VIAL (ML) INJECTION AS NEEDED
Status: DISCONTINUED | OUTPATIENT
Start: 2019-04-17 | End: 2019-04-17 | Stop reason: HOSPADM

## 2019-04-17 RX ORDER — KETOROLAC TROMETHAMINE 30 MG/ML
INJECTION, SOLUTION INTRAMUSCULAR; INTRAVENOUS
Status: COMPLETED
Start: 2019-04-17 | End: 2019-04-17

## 2019-04-17 RX ORDER — ONDANSETRON 2 MG/ML
4 INJECTION INTRAMUSCULAR; INTRAVENOUS EVERY 6 HOURS PRN
Status: DISCONTINUED | OUTPATIENT
Start: 2019-04-17 | End: 2019-04-19 | Stop reason: HOSPADM

## 2019-04-17 RX ORDER — SODIUM CHLORIDE 0.9 % (FLUSH) 0.9 %
3 SYRINGE (ML) INJECTION EVERY 12 HOURS SCHEDULED
Status: DISCONTINUED | OUTPATIENT
Start: 2019-04-17 | End: 2019-04-17 | Stop reason: HOSPADM

## 2019-04-17 RX ORDER — SODIUM CHLORIDE 0.9 % (FLUSH) 0.9 %
1-10 SYRINGE (ML) INJECTION AS NEEDED
Status: DISCONTINUED | OUTPATIENT
Start: 2019-04-17 | End: 2019-04-17 | Stop reason: HOSPADM

## 2019-04-17 RX ORDER — HYDROMORPHONE HCL 110MG/55ML
PATIENT CONTROLLED ANALGESIA SYRINGE INTRAVENOUS AS NEEDED
Status: DISCONTINUED | OUTPATIENT
Start: 2019-04-17 | End: 2019-04-17 | Stop reason: SURG

## 2019-04-17 RX ORDER — ONDANSETRON 4 MG/1
4 TABLET, FILM COATED ORAL EVERY 6 HOURS PRN
Status: DISCONTINUED | OUTPATIENT
Start: 2019-04-17 | End: 2019-04-19 | Stop reason: HOSPADM

## 2019-04-17 RX ORDER — IBUPROFEN 800 MG/1
800 TABLET ORAL EVERY 8 HOURS SCHEDULED
Status: DISCONTINUED | OUTPATIENT
Start: 2019-04-18 | End: 2019-04-19 | Stop reason: HOSPADM

## 2019-04-17 RX ORDER — ACETAMINOPHEN 325 MG/1
650 TABLET ORAL ONCE AS NEEDED
Status: DISCONTINUED | OUTPATIENT
Start: 2019-04-17 | End: 2019-04-17 | Stop reason: HOSPADM

## 2019-04-17 RX ORDER — MIDAZOLAM HYDROCHLORIDE 1 MG/ML
2 INJECTION INTRAMUSCULAR; INTRAVENOUS
Status: DISCONTINUED | OUTPATIENT
Start: 2019-04-17 | End: 2019-04-17 | Stop reason: HOSPADM

## 2019-04-17 RX ORDER — FENTANYL CITRATE 50 UG/ML
50 INJECTION, SOLUTION INTRAMUSCULAR; INTRAVENOUS
Status: DISCONTINUED | OUTPATIENT
Start: 2019-04-17 | End: 2019-04-17 | Stop reason: HOSPADM

## 2019-04-17 RX ORDER — DIPHENHYDRAMINE HCL 25 MG
25 CAPSULE ORAL EVERY 6 HOURS PRN
Status: DISCONTINUED | OUTPATIENT
Start: 2019-04-17 | End: 2019-04-17 | Stop reason: HOSPADM

## 2019-04-17 RX ORDER — MIDAZOLAM HYDROCHLORIDE 1 MG/ML
1 INJECTION INTRAMUSCULAR; INTRAVENOUS
Status: DISCONTINUED | OUTPATIENT
Start: 2019-04-17 | End: 2019-04-17 | Stop reason: HOSPADM

## 2019-04-17 RX ORDER — ONDANSETRON 2 MG/ML
INJECTION INTRAMUSCULAR; INTRAVENOUS AS NEEDED
Status: DISCONTINUED | OUTPATIENT
Start: 2019-04-17 | End: 2019-04-17 | Stop reason: SURG

## 2019-04-17 RX ORDER — PROMETHAZINE HYDROCHLORIDE 25 MG/1
25 TABLET ORAL ONCE AS NEEDED
Status: DISCONTINUED | OUTPATIENT
Start: 2019-04-17 | End: 2019-04-17 | Stop reason: HOSPADM

## 2019-04-17 RX ORDER — SIMETHICONE 80 MG
80 TABLET,CHEWABLE ORAL 4 TIMES DAILY PRN
Status: DISCONTINUED | OUTPATIENT
Start: 2019-04-17 | End: 2019-04-19 | Stop reason: HOSPADM

## 2019-04-17 RX ORDER — SODIUM CHLORIDE, SODIUM LACTATE, POTASSIUM CHLORIDE, CALCIUM CHLORIDE 600; 310; 30; 20 MG/100ML; MG/100ML; MG/100ML; MG/100ML
100 INJECTION, SOLUTION INTRAVENOUS CONTINUOUS
Status: DISCONTINUED | OUTPATIENT
Start: 2019-04-17 | End: 2019-04-17 | Stop reason: HOSPADM

## 2019-04-17 RX ORDER — ONDANSETRON 2 MG/ML
4 INJECTION INTRAMUSCULAR; INTRAVENOUS ONCE AS NEEDED
Status: DISCONTINUED | OUTPATIENT
Start: 2019-04-17 | End: 2019-04-17 | Stop reason: HOSPADM

## 2019-04-17 RX ORDER — LIDOCAINE HYDROCHLORIDE 40 MG/ML
SOLUTION TOPICAL AS NEEDED
Status: DISCONTINUED | OUTPATIENT
Start: 2019-04-17 | End: 2019-04-17 | Stop reason: SURG

## 2019-04-17 RX ORDER — LIDOCAINE HYDROCHLORIDE 10 MG/ML
0.5 INJECTION, SOLUTION EPIDURAL; INFILTRATION; INTRACAUDAL; PERINEURAL ONCE AS NEEDED
Status: DISCONTINUED | OUTPATIENT
Start: 2019-04-17 | End: 2019-04-17 | Stop reason: HOSPADM

## 2019-04-17 RX ORDER — NALOXONE HCL 0.4 MG/ML
0.4 VIAL (ML) INJECTION
Status: DISCONTINUED | OUTPATIENT
Start: 2019-04-17 | End: 2019-04-18

## 2019-04-17 RX ORDER — FAMOTIDINE 10 MG/ML
20 INJECTION, SOLUTION INTRAVENOUS ONCE
Status: COMPLETED | OUTPATIENT
Start: 2019-04-17 | End: 2019-04-17

## 2019-04-17 RX ORDER — SODIUM CHLORIDE, SODIUM LACTATE, POTASSIUM CHLORIDE, CALCIUM CHLORIDE 600; 310; 30; 20 MG/100ML; MG/100ML; MG/100ML; MG/100ML
9 INJECTION, SOLUTION INTRAVENOUS CONTINUOUS
Status: DISCONTINUED | OUTPATIENT
Start: 2019-04-17 | End: 2019-04-17 | Stop reason: HOSPADM

## 2019-04-17 RX ORDER — DEXAMETHASONE SODIUM PHOSPHATE 10 MG/ML
INJECTION INTRAMUSCULAR; INTRAVENOUS AS NEEDED
Status: DISCONTINUED | OUTPATIENT
Start: 2019-04-17 | End: 2019-04-17 | Stop reason: SURG

## 2019-04-17 RX ORDER — FLUMAZENIL 0.1 MG/ML
0.2 INJECTION INTRAVENOUS AS NEEDED
Status: DISCONTINUED | OUTPATIENT
Start: 2019-04-17 | End: 2019-04-17 | Stop reason: HOSPADM

## 2019-04-17 RX ORDER — SODIUM CHLORIDE, SODIUM LACTATE, POTASSIUM CHLORIDE, CALCIUM CHLORIDE 600; 310; 30; 20 MG/100ML; MG/100ML; MG/100ML; MG/100ML
100 INJECTION, SOLUTION INTRAVENOUS CONTINUOUS
Status: DISCONTINUED | OUTPATIENT
Start: 2019-04-17 | End: 2019-04-19 | Stop reason: HOSPADM

## 2019-04-17 RX ORDER — LABETALOL HYDROCHLORIDE 5 MG/ML
5 INJECTION, SOLUTION INTRAVENOUS
Status: DISCONTINUED | OUTPATIENT
Start: 2019-04-17 | End: 2019-04-17 | Stop reason: HOSPADM

## 2019-04-17 RX ORDER — PROMETHAZINE HYDROCHLORIDE 25 MG/1
25 SUPPOSITORY RECTAL ONCE AS NEEDED
Status: DISCONTINUED | OUTPATIENT
Start: 2019-04-17 | End: 2019-04-17 | Stop reason: HOSPADM

## 2019-04-17 RX ORDER — DOCUSATE SODIUM 100 MG/1
100 CAPSULE, LIQUID FILLED ORAL 2 TIMES DAILY
Status: DISCONTINUED | OUTPATIENT
Start: 2019-04-17 | End: 2019-04-19 | Stop reason: HOSPADM

## 2019-04-17 RX ORDER — CEFAZOLIN SODIUM 2 G/100ML
2 INJECTION, SOLUTION INTRAVENOUS ONCE
Status: COMPLETED | OUTPATIENT
Start: 2019-04-17 | End: 2019-04-17

## 2019-04-17 RX ORDER — DIPHENHYDRAMINE HCL 50 MG
50 CAPSULE ORAL EVERY 6 HOURS PRN
Status: DISCONTINUED | OUTPATIENT
Start: 2019-04-17 | End: 2019-04-19 | Stop reason: HOSPADM

## 2019-04-17 RX ORDER — IPRATROPIUM BROMIDE AND ALBUTEROL SULFATE 2.5; .5 MG/3ML; MG/3ML
3 SOLUTION RESPIRATORY (INHALATION) ONCE
Status: COMPLETED | OUTPATIENT
Start: 2019-04-17 | End: 2019-04-17

## 2019-04-17 RX ORDER — GLYCOPYRROLATE 0.2 MG/ML
INJECTION INTRAMUSCULAR; INTRAVENOUS AS NEEDED
Status: DISCONTINUED | OUTPATIENT
Start: 2019-04-17 | End: 2019-04-17 | Stop reason: SURG

## 2019-04-17 RX ORDER — MIDAZOLAM HYDROCHLORIDE 1 MG/ML
INJECTION INTRAMUSCULAR; INTRAVENOUS AS NEEDED
Status: DISCONTINUED | OUTPATIENT
Start: 2019-04-17 | End: 2019-04-17 | Stop reason: SURG

## 2019-04-17 RX ORDER — SODIUM CHLORIDE 0.9 % (FLUSH) 0.9 %
3-10 SYRINGE (ML) INJECTION AS NEEDED
Status: DISCONTINUED | OUTPATIENT
Start: 2019-04-17 | End: 2019-04-17 | Stop reason: HOSPADM

## 2019-04-17 RX ORDER — ESCITALOPRAM OXALATE 10 MG/1
10 TABLET ORAL DAILY
Status: DISCONTINUED | OUTPATIENT
Start: 2019-04-17 | End: 2019-04-19 | Stop reason: HOSPADM

## 2019-04-17 RX ORDER — HYDROCODONE BITARTRATE AND ACETAMINOPHEN 7.5; 325 MG/1; MG/1
1 TABLET ORAL ONCE AS NEEDED
Status: DISCONTINUED | OUTPATIENT
Start: 2019-04-17 | End: 2019-04-17 | Stop reason: HOSPADM

## 2019-04-17 RX ORDER — TRANEXAMIC ACID 100 MG/ML
INJECTION, SOLUTION INTRAVENOUS AS NEEDED
Status: DISCONTINUED | OUTPATIENT
Start: 2019-04-17 | End: 2019-04-17 | Stop reason: SURG

## 2019-04-17 RX ADMIN — DEXAMETHASONE SODIUM PHOSPHATE 8 MG: 10 INJECTION INTRAMUSCULAR; INTRAVENOUS at 10:59

## 2019-04-17 RX ADMIN — Medication 2 MG: at 10:44

## 2019-04-17 RX ADMIN — CEFAZOLIN SODIUM 2 G: 2 INJECTION, SOLUTION INTRAVENOUS at 10:46

## 2019-04-17 RX ADMIN — KETOROLAC TROMETHAMINE 15 MG: 30 INJECTION, SOLUTION INTRAMUSCULAR; INTRAVENOUS at 14:25

## 2019-04-17 RX ADMIN — LIDOCAINE HYDROCHLORIDE 1 EACH: 40 SOLUTION TOPICAL at 10:53

## 2019-04-17 RX ADMIN — TRANEXAMIC ACID 1000 MG: 100 INJECTION, SOLUTION INTRAVENOUS at 11:14

## 2019-04-17 RX ADMIN — FAMOTIDINE 20 MG: 10 INJECTION, SOLUTION INTRAVENOUS at 07:58

## 2019-04-17 RX ADMIN — HYDROMORPHONE HYDROCHLORIDE 0.5 MG: 1 INJECTION, SOLUTION INTRAMUSCULAR; INTRAVENOUS; SUBCUTANEOUS at 14:15

## 2019-04-17 RX ADMIN — Medication 2 MG: at 08:00

## 2019-04-17 RX ADMIN — ROCURONIUM BROMIDE 35 MG: 10 INJECTION INTRAVENOUS at 10:49

## 2019-04-17 RX ADMIN — PHENYLEPHRINE HYDROCHLORIDE 100 MCG: 10 INJECTION INTRAVENOUS at 13:08

## 2019-04-17 RX ADMIN — OXYCODONE HYDROCHLORIDE AND ACETAMINOPHEN 1 TABLET: 10; 325 TABLET ORAL at 17:09

## 2019-04-17 RX ADMIN — ONDANSETRON 4 MG: 2 INJECTION INTRAMUSCULAR; INTRAVENOUS at 12:41

## 2019-04-17 RX ADMIN — HYDROMORPHONE HYDROCHLORIDE 0.5 MG: 2 INJECTION INTRAMUSCULAR; INTRAVENOUS; SUBCUTANEOUS at 13:39

## 2019-04-17 RX ADMIN — PHENYLEPHRINE HYDROCHLORIDE 150 MCG: 10 INJECTION INTRAVENOUS at 13:19

## 2019-04-17 RX ADMIN — NEOSTIGMINE METHYLSULFATE 3 MG: 1 INJECTION INTRAMUSCULAR; INTRAVENOUS; SUBCUTANEOUS at 12:56

## 2019-04-17 RX ADMIN — HYDROMORPHONE HYDROCHLORIDE 0.5 MG: 1 INJECTION, SOLUTION INTRAMUSCULAR; INTRAVENOUS; SUBCUTANEOUS at 14:40

## 2019-04-17 RX ADMIN — PROPOFOL 180 MG: 10 INJECTION, EMULSION INTRAVENOUS at 10:49

## 2019-04-17 RX ADMIN — FLUORESCEIN SODIUM 0.25 ML: 100 INJECTION INTRAVENOUS at 12:53

## 2019-04-17 RX ADMIN — FENTANYL CITRATE 50 MCG: 50 INJECTION INTRAMUSCULAR; INTRAVENOUS at 10:45

## 2019-04-17 RX ADMIN — FENTANYL CITRATE 50 MCG: 50 INJECTION INTRAMUSCULAR; INTRAVENOUS at 11:56

## 2019-04-17 RX ADMIN — SODIUM CHLORIDE, POTASSIUM CHLORIDE, SODIUM LACTATE AND CALCIUM CHLORIDE: 600; 310; 30; 20 INJECTION, SOLUTION INTRAVENOUS at 12:55

## 2019-04-17 RX ADMIN — KETOROLAC TROMETHAMINE 15 MG: 30 INJECTION, SOLUTION INTRAMUSCULAR; INTRAVENOUS at 20:27

## 2019-04-17 RX ADMIN — FENTANYL CITRATE 50 MCG: 50 INJECTION, SOLUTION INTRAMUSCULAR; INTRAVENOUS at 14:30

## 2019-04-17 RX ADMIN — PHENYLEPHRINE HYDROCHLORIDE 200 MCG: 10 INJECTION INTRAVENOUS at 13:25

## 2019-04-17 RX ADMIN — FENTANYL CITRATE 50 MCG: 50 INJECTION INTRAMUSCULAR; INTRAVENOUS at 12:21

## 2019-04-17 RX ADMIN — ROCURONIUM BROMIDE 5 MG: 10 INJECTION INTRAVENOUS at 11:31

## 2019-04-17 RX ADMIN — ROCURONIUM BROMIDE 20 MG: 10 INJECTION INTRAVENOUS at 12:25

## 2019-04-17 RX ADMIN — FENTANYL CITRATE 50 MCG: 50 INJECTION INTRAMUSCULAR; INTRAVENOUS at 11:05

## 2019-04-17 RX ADMIN — HYDROMORPHONE HYDROCHLORIDE 1 MG: 2 INJECTION INTRAMUSCULAR; INTRAVENOUS; SUBCUTANEOUS at 11:16

## 2019-04-17 RX ADMIN — FENTANYL CITRATE 50 MCG: 50 INJECTION, SOLUTION INTRAMUSCULAR; INTRAVENOUS at 14:10

## 2019-04-17 RX ADMIN — SODIUM CHLORIDE, POTASSIUM CHLORIDE, SODIUM LACTATE AND CALCIUM CHLORIDE 100 ML/HR: 600; 310; 30; 20 INJECTION, SOLUTION INTRAVENOUS at 15:31

## 2019-04-17 RX ADMIN — GLYCOPYRROLATE 0.4 MG: 0.2 INJECTION INTRAMUSCULAR; INTRAVENOUS at 12:56

## 2019-04-17 RX ADMIN — ESCITALOPRAM 10 MG: 10 TABLET, FILM COATED ORAL at 17:10

## 2019-04-17 RX ADMIN — DOCUSATE SODIUM 100 MG: 100 CAPSULE, LIQUID FILLED ORAL at 20:28

## 2019-04-17 RX ADMIN — OXYCODONE HYDROCHLORIDE AND ACETAMINOPHEN 1 TABLET: 10; 325 TABLET ORAL at 22:53

## 2019-04-17 RX ADMIN — SODIUM CHLORIDE, POTASSIUM CHLORIDE, SODIUM LACTATE AND CALCIUM CHLORIDE: 600; 310; 30; 20 INJECTION, SOLUTION INTRAVENOUS at 10:43

## 2019-04-17 RX ADMIN — LIDOCAINE HYDROCHLORIDE 100 MG: 20 INJECTION, SOLUTION INFILTRATION; PERINEURAL at 10:49

## 2019-04-17 RX ADMIN — IPRATROPIUM BROMIDE AND ALBUTEROL SULFATE 3 ML: 2.5; .5 SOLUTION RESPIRATORY (INHALATION) at 07:44

## 2019-04-17 RX ADMIN — PHENYLEPHRINE HYDROCHLORIDE 100 MCG: 10 INJECTION INTRAVENOUS at 13:13

## 2019-04-17 RX ADMIN — ROCURONIUM BROMIDE 10 MG: 10 INJECTION INTRAVENOUS at 11:22

## 2019-04-17 RX ADMIN — SODIUM CHLORIDE, POTASSIUM CHLORIDE, SODIUM LACTATE AND CALCIUM CHLORIDE 9 ML/HR: 600; 310; 30; 20 INJECTION, SOLUTION INTRAVENOUS at 08:00

## 2019-04-17 RX ADMIN — ROCURONIUM BROMIDE 10 MG: 10 INJECTION INTRAVENOUS at 11:47

## 2019-04-17 NOTE — BRIEF OP NOTE
TOTAL ABDOMINAL HYSTERECTOMY  Progress Note    Candi Valerio  4/17/2019    Pre-op Diagnosis:   Intramural and subserous leiomyoma of uterus [D25.1, D25.2]       Post-Op Diagnosis Codes:     * Intramural and subserous leiomyoma of uterus [D25.1, D25.2]    Procedure/CPT® Codes:      Procedure(s):  ABDOMINAL SUPRA CERVICAL HYSTERECTOMY BILATERAL SALPINGECTOMY RIGHT OOPHERECTOMY CYSTOSCOPY    Surgeon(s):  Curtis Ventura MD Keller, Cara C, MD    Anesthesia: General    Staff:   Circulator: Spurling, Shannon, RN; Elba Mckenna RN  Scrub Person: Claudia Mckeon; Ravinder Anderson    Estimated Blood Loss: 500 mL    Urine Voided: 275 mL    Specimens:                Specimens     ID Source Type Tests Collected By Collected At Frozen?      A Uterus with Fallopian Tubes Tissue · TISSUE PATHOLOGY EXAM   Curtis Ventura MD 4/17/19 1256      Description: UTERUS WITH BILATERAL FALLOPIAN TUBES AND RIGHT OVARY                Drains:   Urethral Catheter Double-lumen 16 Fr. (Active)       Findings: Uterus globally enlarged 18 week size; likely adenomyosis + fibroids, ovaries normal bilaterally, appendix normal    Complications: none      Curtis Ventura MD     Date: 4/17/2019  Time: 1:41 PM

## 2019-04-17 NOTE — PLAN OF CARE
Problem: Patient Care Overview  Goal: Plan of Care Review  Outcome: Ongoing (interventions implemented as appropriate)   04/17/19 7550   Coping/Psychosocial   Plan of Care Reviewed With patient;spouse   Plan of Care Review   Progress improving   OTHER   Outcome Summary Abd dsng dry and intact. Ice pack in use. King cath- bright yellow. tolerating regular diet.     Goal: Individualization and Mutuality  Outcome: Ongoing (interventions implemented as appropriate)    Goal: Discharge Needs Assessment  Outcome: Ongoing (interventions implemented as appropriate)    Goal: Interprofessional Rounds/Family Conf  Outcome: Ongoing (interventions implemented as appropriate)      Problem: Hysterectomy (Adult)  Goal: Signs and Symptoms of Listed Potential Problems Will be Absent, Minimized or Managed (Hysterectomy)  Outcome: Ongoing (interventions implemented as appropriate)    Goal: Anesthesia/Sedation Recovery  Outcome: Outcome(s) achieved Date Met: 04/17/19

## 2019-04-17 NOTE — H&P
"Chief complaint: Surgery  History of present illness: Patient presents today for abdominal hysterectomy secondary to a large fibroid uterus.  Patient initially saw me in 2016 for evaluation for hysterectomy.  She was also having stress urinary incontinence.  We had planned a combined procedure with urogynecology to perform hysterectomy with mid urethral sling.  Unfortunately, at that time, her insurance company would not approve the hysterectomy and the patient elected proceed only with the sling procedure.  Initially she had improvement in her stress urinary incontinence, but now she is having issues again with leakage of urine.  Her biggest concern at this time however is with new onset of very heavy periods.  In total her periods last about 5 days but are extremely heavy in the first 2-3 days, going through about 10 pads per day.  She also reports pain during these 2 days is excruciating.  She went to the emergency room in 2019 for the symptoms and on CT scan was noted to have an extremely large fibroid uterus.  She previously had an ultrasound in my office in  that showed a uterus measuring 13 cm in maximum dimension with a 7 cm dominant fundal fibroid.  The patient at this time again wishes to proceed with definitive surgical management.  She is in her usual state of health today.  She says her period did begin today.  She still wishes to proceed with hysterectomy at this time.    Obstetric History       T0      L3     SAB1   TAB0   Ectopic0   Molar0   Multiple0   Live Births0       # Outcome Date GA Lbr Israel/2nd Weight Sex Delivery Anes PTL Lv   4 SAB            3   35w0d   F       2   35w0d   F       1   35w0d   M Vag-Spont           Past Medical History:   Diagnosis Date   • Abnormal menses     WITH MULTIPLE FIBROIDS   • Abnormal Pap smear of cervix    • Anxiety    • Sleep apnea     NO C-PAP IN USE-\"VERY MILD CASE\"       Past Surgical History:   Procedure " "Laterality Date   • D&C WITH SUCTION     • INCONTINENCE SURGERY     • TUBAL ABDOMINAL LIGATION         Family History   Adopted: Yes   Problem Relation Age of Onset   • Malig Hyperthermia Neg Hx        Social History     Tobacco Use   • Smoking status: Current Every Day Smoker     Packs/day: 1.00     Years: 25.00     Pack years: 25.00     Types: Cigarettes   • Smokeless tobacco: Never Used   Substance Use Topics   • Alcohol use: Yes     Frequency: Never     Comment: OCC   • Drug use: No     No current facility-administered medications on file prior to encounter.      Current Outpatient Medications on File Prior to Encounter   Medication Sig   • escitalopram (LEXAPRO) 10 MG tablet Take 10 mg by mouth Daily.   • omeprazole (priLOSEC) 40 MG capsule Take 40 mg by mouth Daily.   • Biotin 300 MCG tablet Take 1 tablet by mouth Daily. 4/12/19-LAST DOSE       Allergies   Allergen Reactions   • Codeine Swelling   • Adhesive Tape Rash       ROS:  General: No fever or chills  Constitutional: No weight loss or gain, no hair loss  HENT: No headache, no hearing loss, no tinnitus  Eyes: normal vision, no eye pain  Lungs: No cough, no shortness of breath  Heart: No chest pain, no palpitations  Abdomen: No nausea, vomiting, constipation or diarrhea  : No dysuria, no hematuria  Skin: No rashes  Lymph: No swelling  Neuro: No parathesia, no weakness  Psych: Normal though content, no hallucinations, no SI/HI    PE:  Vitals:    04/17/19 0717 04/17/19 0744   BP: 123/78    BP Location: Right arm    Patient Position: Lying    Pulse: 62 65   Resp: 18 18   Temp: 98.2 °F (36.8 °C)    TempSrc: Oral    SpO2: 97% 97%   Weight: 78.9 kg (174 lb)    Height: 162.6 cm (64\")      General: No acute distress, awake and oriented ×3  Lungs: Clear to auscultation bilaterally  Cardiovascular: Regular rate and rhythm  Abdomen: Soft, nontender, nondistended, normoactive bowel sounds  Pelvic exam in the office: Uterus is anteverted, markedly enlarged about " 16-18 weeks size and mildly tender, irregular borders consistent with fibroids  Extremities: No Tenderness, no Homans sign, no lower extremity edema    Preop labs:  Type and screen A+, antibody negative  Hemoglobin/hematocrit 13.5/42.4  HCG negative  Basic metabolic panel normal except for glucose 102    Preop x-ray: No acute cardiopulmonary process  Preop EKG: Sinus rhythm    Assessment:  1.  43-year-old  4 para 3 with large symptomatic uterine fibroids  2.  Menorrhagia  3.  Dysmenorrhea  4.  Tobacco use    Plan:  1.  Management options have been discussed with the patient at length including medical, minimally invasive surgical attempts, and more definitive surgery with hysterectomy.  Because of the large size of her uterus and fibroids, I do not feel that laparoscopic or vaginal approach is feasible, and I have recommended abdominal hysterectomy.  We discussed the use of either a Pfannenstiel or midline approach.  She verbalizes understanding of this.  Decision will be made after exam in the operating room.  Patient would like to retain her ovaries unless found to be diseased or damaged.  Risks, benefits, alternatives of the surgery were discussed at length and she wishes to proceed today.  2.  Routine antibiotic prophylaxis with cefazolin 2 g IV preoperatively.  3.  DVT prevention with SCDs and early ambulation in the postoperative setting.  4.  Postoperative plan is for admit to the hospital.   no

## 2019-04-17 NOTE — ANESTHESIA POSTPROCEDURE EVALUATION
"Patient: Candi Valerio    Procedure Summary     Date:  04/17/19 Room / Location:  [facility] OR [address] [place] [address] MAIN OR    Anesthesia Start:  1043 Anesthesia Stop:  1351    Procedure:  ABDOMINAL SUPRA CERVICAL HYSTERECTOMY BILATERAL SALPINGECTOMY RIGHT OOPHERECTOMY CYSTOSCOPY (N/A Abdomen) Diagnosis:       Intramural and subserous leiomyoma of uterus      (Intramural and subserous leiomyoma of uterus [D25.1, D25.2])    Surgeon:  Curtis Ventura MD Provider:  Jin Benz MD    Anesthesia Type:  general ASA Status:  3          Anesthesia Type: general  Last vitals  BP   111/70 (04/17/19 1500)   Temp   37.1 °C (98.7 °F) (04/17/19 1345)   Pulse   83 (04/17/19 1500)   Resp   16 (04/17/19 1500)     SpO2   94 % (04/17/19 1500)     Post Anesthesia Care and Evaluation    Patient location during evaluation: bedside  Patient participation: complete - patient participated  Level of consciousness: awake and alert  Pain management: adequate  Airway patency: patent  Anesthetic complications: No anesthetic complications    Cardiovascular status: acceptable  Respiratory status: acceptable  Hydration status: acceptable    Comments: /70   Pulse 83   Temp 37.1 °C (98.7 °F) (Oral)   Resp 16   Ht 162.6 cm (64\")   Wt 78.9 kg (174 lb)   LMP 04/17/2019   SpO2 94%   BMI 29.87 kg/m²       "

## 2019-04-17 NOTE — ANESTHESIA PREPROCEDURE EVALUATION
Anesthesia Evaluation     Patient summary reviewed   NPO Solid Status: > 8 hours  NPO Liquid Status: > 2 hours           Airway   Mallampati: II  TM distance: >3 FB  Neck ROM: full  Dental      Comment: Upper partial    Pulmonary     breath sounds clear to auscultation  (+) a smoker Abstained day of surgery, sleep apnea,   (-) shortness of breath  Cardiovascular   Exercise tolerance: good (4-7 METS)    Rhythm: regular  Rate: normal    (-) angina, ZIMMERMAN      Neuro/Psych  GI/Hepatic/Renal/Endo      Musculoskeletal     Abdominal    Substance History      OB/GYN          Other                        Anesthesia Plan    ASA 3     general     intravenous induction   Anesthetic plan, all risks, benefits, and alternatives have been provided, discussed and informed consent has been obtained with: patient.

## 2019-04-17 NOTE — ANESTHESIA PROCEDURE NOTES
Airway  Urgency: elective    Airway not difficult    General Information and Staff    Patient location during procedure: OR  Anesthesiologist: Jin Benz MD  CRNA: Ria Mullen CRNA    Indications and Patient Condition  Indications for airway management: airway protection    Preoxygenated: yes  Mask difficulty assessment: 1 - vent by mask    Final Airway Details  Final airway type: endotracheal airway      Successful airway: ETT  Cuffed: yes   Successful intubation technique: direct laryngoscopy  Facilitating devices/methods: intubating stylet  Blade: Shantell  Blade size: 3  ETT size (mm): 7.0  Cormack-Lehane Classification: grade IIa - partial view of glottis  Placement verified by: chest auscultation and capnometry   Measured from: lips  ETT to lips (cm): 23  Number of attempts at approach: 1    Additional Comments  Preoxygenated with 100% FiO2. Smooth IV induction. Atraumatic insertion. No change to dentition or soft tissue.

## 2019-04-18 LAB
ANION GAP SERPL CALCULATED.3IONS-SCNC: 13 MMOL/L
BASOPHILS # BLD AUTO: 0.02 10*3/MM3 (ref 0–0.2)
BASOPHILS NFR BLD AUTO: 0.1 % (ref 0–1.5)
BUN BLD-MCNC: 11 MG/DL (ref 6–20)
BUN/CREAT SERPL: 15.1 (ref 7–25)
CALCIUM SPEC-SCNC: 8.7 MG/DL (ref 8.6–10.5)
CHLORIDE SERPL-SCNC: 101 MMOL/L (ref 98–107)
CO2 SERPL-SCNC: 26 MMOL/L (ref 22–29)
CREAT BLD-MCNC: 0.73 MG/DL (ref 0.57–1)
CYTO UR: NORMAL
DEPRECATED RDW RBC AUTO: 48.8 FL (ref 37–54)
EOSINOPHIL # BLD AUTO: 0 10*3/MM3 (ref 0–0.4)
EOSINOPHIL NFR BLD AUTO: 0 % (ref 0.3–6.2)
ERYTHROCYTE [DISTWIDTH] IN BLOOD BY AUTOMATED COUNT: 13.5 % (ref 12.3–15.4)
GFR SERPL CREATININE-BSD FRML MDRD: 87 ML/MIN/1.73
GLUCOSE BLD-MCNC: 173 MG/DL (ref 65–99)
HCT VFR BLD AUTO: 34.9 % (ref 34–46.6)
HGB BLD-MCNC: 11.1 G/DL (ref 12–15.9)
IMM GRANULOCYTES # BLD AUTO: 0.07 10*3/MM3 (ref 0–0.05)
IMM GRANULOCYTES NFR BLD AUTO: 0.5 % (ref 0–0.5)
LAB AP CASE REPORT: NORMAL
LAB AP DIAGNOSIS COMMENT: NORMAL
LYMPHOCYTES # BLD AUTO: 2.17 10*3/MM3 (ref 0.7–3.1)
LYMPHOCYTES NFR BLD AUTO: 15.1 % (ref 19.6–45.3)
MCH RBC QN AUTO: 31.5 PG (ref 26.6–33)
MCHC RBC AUTO-ENTMCNC: 31.8 G/DL (ref 31.5–35.7)
MCV RBC AUTO: 99.1 FL (ref 79–97)
MONOCYTES # BLD AUTO: 0.75 10*3/MM3 (ref 0.1–0.9)
MONOCYTES NFR BLD AUTO: 5.2 % (ref 5–12)
NEUTROPHILS # BLD AUTO: 11.4 10*3/MM3 (ref 1.4–7)
NEUTROPHILS NFR BLD AUTO: 79.1 % (ref 42.7–76)
NRBC BLD AUTO-RTO: 0 /100 WBC (ref 0–0)
PATH REPORT.FINAL DX SPEC: NORMAL
PATH REPORT.GROSS SPEC: NORMAL
PLATELET # BLD AUTO: 274 10*3/MM3 (ref 140–450)
PMV BLD AUTO: 10.6 FL (ref 6–12)
POTASSIUM BLD-SCNC: 4.4 MMOL/L (ref 3.5–5.2)
RBC # BLD AUTO: 3.52 10*6/MM3 (ref 3.77–5.28)
SODIUM BLD-SCNC: 140 MMOL/L (ref 136–145)
WBC NRBC COR # BLD: 14.41 10*3/MM3 (ref 3.4–10.8)

## 2019-04-18 PROCEDURE — 80048 BASIC METABOLIC PNL TOTAL CA: CPT | Performed by: OBSTETRICS & GYNECOLOGY

## 2019-04-18 PROCEDURE — 85025 COMPLETE CBC W/AUTO DIFF WBC: CPT | Performed by: OBSTETRICS & GYNECOLOGY

## 2019-04-18 PROCEDURE — 25010000002 KETOROLAC TROMETHAMINE PER 15 MG: Performed by: OBSTETRICS & GYNECOLOGY

## 2019-04-18 PROCEDURE — 25010000002 MORPHINE PER 10 MG: Performed by: OBSTETRICS & GYNECOLOGY

## 2019-04-18 RX ORDER — HYDROCODONE BITARTRATE AND ACETAMINOPHEN 10; 325 MG/1; MG/1
1 TABLET ORAL EVERY 6 HOURS PRN
Status: DISCONTINUED | OUTPATIENT
Start: 2019-04-18 | End: 2019-04-19 | Stop reason: HOSPADM

## 2019-04-18 RX ADMIN — KETOROLAC TROMETHAMINE 15 MG: 30 INJECTION, SOLUTION INTRAMUSCULAR; INTRAVENOUS at 02:22

## 2019-04-18 RX ADMIN — IBUPROFEN 800 MG: 800 TABLET ORAL at 22:24

## 2019-04-18 RX ADMIN — SODIUM CHLORIDE, POTASSIUM CHLORIDE, SODIUM LACTATE AND CALCIUM CHLORIDE 100 ML/HR: 600; 310; 30; 20 INJECTION, SOLUTION INTRAVENOUS at 03:20

## 2019-04-18 RX ADMIN — KETOROLAC TROMETHAMINE 15 MG: 30 INJECTION, SOLUTION INTRAMUSCULAR; INTRAVENOUS at 08:33

## 2019-04-18 RX ADMIN — SIMETHICONE CHEW TAB 80 MG 80 MG: 80 TABLET ORAL at 12:02

## 2019-04-18 RX ADMIN — PANTOPRAZOLE SODIUM 40 MG: 40 TABLET, DELAYED RELEASE ORAL at 05:43

## 2019-04-18 RX ADMIN — OXYCODONE HYDROCHLORIDE AND ACETAMINOPHEN 1 TABLET: 10; 325 TABLET ORAL at 05:43

## 2019-04-18 RX ADMIN — MORPHINE SULFATE 2 MG: 2 INJECTION, SOLUTION INTRAMUSCULAR; INTRAVENOUS at 02:23

## 2019-04-18 RX ADMIN — DOCUSATE SODIUM 100 MG: 100 CAPSULE, LIQUID FILLED ORAL at 08:33

## 2019-04-18 RX ADMIN — IBUPROFEN 800 MG: 800 TABLET ORAL at 14:33

## 2019-04-18 RX ADMIN — OXYCODONE HYDROCHLORIDE AND ACETAMINOPHEN 1 TABLET: 10; 325 TABLET ORAL at 12:02

## 2019-04-18 RX ADMIN — HYDROCODONE BITARTRATE AND ACETAMINOPHEN 1 TABLET: 10; 325 TABLET ORAL at 22:24

## 2019-04-18 RX ADMIN — HYDROCODONE BITARTRATE AND ACETAMINOPHEN 1 TABLET: 10; 325 TABLET ORAL at 16:14

## 2019-04-18 RX ADMIN — SIMETHICONE CHEW TAB 80 MG 80 MG: 80 TABLET ORAL at 05:43

## 2019-04-18 RX ADMIN — ESCITALOPRAM 10 MG: 10 TABLET, FILM COATED ORAL at 08:33

## 2019-04-18 RX ADMIN — DOCUSATE SODIUM 100 MG: 100 CAPSULE, LIQUID FILLED ORAL at 20:35

## 2019-04-18 NOTE — PLAN OF CARE
Problem: Patient Care Overview  Goal: Plan of Care Review  Outcome: Ongoing (interventions implemented as appropriate)   04/18/19 8198   Coping/Psychosocial   Plan of Care Reviewed With patient   Plan of Care Review   Progress improving   OTHER   Outcome Summary Incision dressing CD&I, scant amount of vaginal bleeding present. Voiding freely after catheter removal. BM today. Was c/o severe gas pain this AM, but after Simethicone given and ambulating, states relief. Now c/o severe abdominal pain, order switched from Percocet to Lortab this afternoon and will try with next dose. Ambulating in walls. Saline locked. VSS.     Goal: Individualization and Mutuality  Outcome: Ongoing (interventions implemented as appropriate)    Goal: Discharge Needs Assessment  Outcome: Ongoing (interventions implemented as appropriate)    Goal: Interprofessional Rounds/Family Conf  Outcome: Ongoing (interventions implemented as appropriate)      Problem: Hysterectomy (Adult)  Goal: Signs and Symptoms of Listed Potential Problems Will be Absent, Minimized or Managed (Hysterectomy)  Outcome: Ongoing (interventions implemented as appropriate)      Problem: Fall Risk (Adult)  Goal: Identify Related Risk Factors and Signs and Symptoms  Outcome: Outcome(s) achieved Date Met: 04/18/19    Goal: Absence of Fall  Outcome: Ongoing (interventions implemented as appropriate)

## 2019-04-18 NOTE — PROGRESS NOTES
Assessment/Plan:  Status post SAH. Doing well postoperatively.     1) POD #1 from SAH- Routine post operative care reviewed. Expectations to be able to discharge from hospital discussed. Awaiting better GI function. Pain control okay as well.       Subjective:  Post-op 1    The patient feels well. Pain is moderately controlled with current medications.  Urinary output is adequate. The patient is ambulating well. The patient is tolerating a normal diet. Flatus denies been passed.    Objective:  Vital signs in last 24 hours:  Temp:  [96.6 °F (35.9 °C)-98.7 °F (37.1 °C)] 98 °F (36.7 °C)  Heart Rate:  [67-94] 81  Resp:  [16-18] 16  BP: ()/(45-91) 91/54      General:    alert, appears stated age and cooperative   Bowel Sounds:  active   Incision:  Dressing in place    DVT Evaluation:  No evidence of DVT seen on physical exam.     Lab Results   Component Value Date    WBC 14.41 (H) 04/18/2019    HGB 11.1 (L) 04/18/2019    HCT 34.9 04/18/2019    MCV 99.1 (H) 04/18/2019     04/18/2019       Leonides Schneider MD  4/18/2019  9:54 AM

## 2019-04-18 NOTE — OP NOTE
"Procedure: Abdominal supracervical hysterectomy  2.  Bilateral salpingectomy  3.  Right oophorectomy    Surgeon: Jayjay Ventura MD    Assistant: Radha Hussein MD    Preop diagnosis:   1.  43-year-old  4 para 3 with large uterine fibroids  2.  Menorrhagia  3.  Dysmenorrhea  4.  Stress urinary incontinence    Postop diagnosis: Same    Indications: Patient had initially see me in  and was found to have a large uterine fibroids, especially with a large dominant fundal fibroid measuring 7 cm.  At that time she was mostly having problems with stress urinary incontinence.  She was referred to urogynecology, and the patient was planning to have hysterectomy with a combined mid urethral sling placement.  Her insurance company would not approve the hysterectomy at that time, so she elected to proceed only with the mid urethral sling.  She initially had improvement in her incontinence symptoms, but they have subsequently returned.  Now, she is having greater difficulties with heavy periods and extremely painful periods especially on the first 2-3 days of her period.  She had gone to the emergency room in 2019 and a CT scan revealed a \"large fibroid uterus\".  On exam, she has a bulky enlarged uterus about 18 weeks size.  Patient's been counseled about management options.  She wishes to proceed with definitive surgical therapy with hysterectomy.  She would like to retain her ovaries unless diseased or damaged but is aware of the potential need for removal of 1 or both ovaries.    Findings: Uterus about 18 weeks size, the uterus appears globally enlarged and may be consistent with some combination of adenomyosis plus fibroids.  The ovaries are normal bilaterally.  There is clips in place and the tubes from prior tubal ligation procedure.  Her appendix is normal.    Anesthesia: GETA    EBL: 500 mL    Pathology: Uterus, bilateral tubes, right ovary    Complications: Conversion to a supracervical hysterectomy " because of an extremely long cervix and patient had generally been oozy throughout the procedure.  In order to decrease potential complications such as continued blood loss and bladder injury, the decision was made intraoperatively to proceed instead with a supracervical hysterectomy.    Procedure: Patient was taken to operating room. After adequate general anesthesia was placed on OR table in dorsal supine position. Patient identified with two identifiers and timeout performed with all team members agreeing to the planned procedure.  Patient received cefazolin 2 grams IV prior to surgery for antibiotic prophylaxis.  She was wearing SCDs.  The vagina was prepped with yhe Betadine prep as per hospital protocol.  The abdomen was prepped in a sterile fashion using Chloraprep sticks times 2.  We allowed 3 minutes the Chloraprep to dry.  The patient was then sterilely draped in a typical fashion.  A Pfannenstiel incision made with the knife and taken through the subcutaneous tissue to the fascia with the Bovie. The fascia scored in the midline and the incision was extended laterally with curved Cameron scissors. The superior rectus fascia was grasped with Kocher clamps times two and divided off the underlying rectus muscles. This was repeated on the inferior aspect. The rectus muscles were then  in the midline and the parietal peritoneum was entered digitally.   The uterus was elevated and delivered through the abdominal incision.  Inspection of the pelvic cavity revealed the findings as noted above.  The round ligament was doubly ligated with 0 Vicryl, and the broad ligament was divided into anterior and posterior leaflets.  There was extremely large uterine vessels on the right side, almost consistent with the types of vasculature seen at the time of full-term pregnancy.  Because of the blood supply to the uterus and to the right ovary, when we took down the uterine ovarian ligament, there was continued bleeding  from this area.  In order to ligate all of the bleeding, I felt that we had sacrificed some of the blood supply to the right ovary.  I felt this placed the right ovary at risk of necrosis, so the decision was made to proceed with a right oophorectomy.  The right ureter was identified, and the right infundibulopelvic ligament was doubly clamped near the ovary and well away from the ureter.  The right ovary was excised and the pedicle doubly ligated with 0 Vicryl.   The anterior leaflet of the broad ligament was then further dissected with the Metzenbaums and the bladder flap was dissected off inferiorly.  The uterine arteries were skeletonized.  The uterine artery was then doubly clamped, transected, and ligated with 0 Vicryl.  On the left side, the round and broad ligament were taken in a fashion similar to the right; however, when it came time to divide the utero-ovarian ligament, this time we were able to spare the blood supply to the left ovary and were able to leave the left ovary in situ.  The left uterine artery was then skeletonized, and the artery was doubly clamped with curved González clamps, transected, and doubly ligated with 0 Vicryl.  Because of the large bulky nature of the uterus, the decision was made at this point to amputate the uterine fundus at the level of the internal cervical loss.  The uterus was amputated with the Bovie and sent off to pathology.  We began to start taking the cardinal ligaments with serial clamps of the straight Maceo's.  These were transected and ligated with 0 Vicryl.  Patient had many cervical branches in this area, and some bleeding was encountered.  Ultimately we were able to cauterize the bleeders with a combination of a LigaSure device and with more sutures.  Patient still seemed to have about another 5 cm of cervix remaining, and I do not feel it was worth the risk of continued bleeding, continued operative time, and potential for bladder injury as we had to  dissect off the bladder further in order to get the remainder of the cervix.  I decided instead that supracervical hysterectomy would be appropriate.  The endocervix was thoroughly cauterized with the Bovie.  The cervical bed was then oversewn with several figure-of-eight stitches of 0 Vicryl.  The pelvis was irrigated and suctioned clean.  Good hemostasis was noted from the ovarian pedicles and the cervical stump.  Surgicel was applied to the right edges.  Good hemostasis was noted from the cuff and all the pedicles. Lap counts were correct at this point.  The rectus fascia was then closed with 0 Vicryl in a running fashion.  Subcutaneous tissue was irrigated and reapproximated with 2-0 plain gut.  The skin was closed with 4-0 Monocryl in a subcuticular fashion.  Dermabond was applied.  Sterile dressing was applied.  Counts for needles, sponges, laps and instruments were correct times two at the end of the procedure. I was present and scrubbed for the entire procedure. There were no major complications. The patient was transported to the recovery area in stable condition.

## 2019-04-19 VITALS
WEIGHT: 174 LBS | RESPIRATION RATE: 16 BRPM | OXYGEN SATURATION: 93 % | HEIGHT: 64 IN | TEMPERATURE: 98.8 F | SYSTOLIC BLOOD PRESSURE: 91 MMHG | DIASTOLIC BLOOD PRESSURE: 53 MMHG | HEART RATE: 66 BPM | BODY MASS INDEX: 29.71 KG/M2

## 2019-04-19 PROCEDURE — 99024 POSTOP FOLLOW-UP VISIT: CPT | Performed by: OBSTETRICS & GYNECOLOGY

## 2019-04-19 RX ORDER — IBUPROFEN 800 MG/1
800 TABLET ORAL EVERY 8 HOURS PRN
Qty: 30 TABLET | Refills: 0 | Status: SHIPPED | OUTPATIENT
Start: 2019-04-19 | End: 2019-05-16

## 2019-04-19 RX ORDER — HYDROCODONE BITARTRATE AND ACETAMINOPHEN 5; 325 MG/1; MG/1
1-2 TABLET ORAL EVERY 6 HOURS PRN
Qty: 30 TABLET | Refills: 0 | Status: SHIPPED | OUTPATIENT
Start: 2019-04-19 | End: 2019-04-26

## 2019-04-19 RX ADMIN — ESCITALOPRAM 10 MG: 10 TABLET, FILM COATED ORAL at 09:17

## 2019-04-19 RX ADMIN — DOCUSATE SODIUM 100 MG: 100 CAPSULE, LIQUID FILLED ORAL at 09:15

## 2019-04-19 RX ADMIN — HYDROCODONE BITARTRATE AND ACETAMINOPHEN 1 TABLET: 10; 325 TABLET ORAL at 09:15

## 2019-04-19 RX ADMIN — IBUPROFEN 800 MG: 800 TABLET ORAL at 05:54

## 2019-04-19 RX ADMIN — HYDROCODONE BITARTRATE AND ACETAMINOPHEN 1 TABLET: 10; 325 TABLET ORAL at 04:14

## 2019-04-19 RX ADMIN — IBUPROFEN 800 MG: 800 TABLET ORAL at 12:36

## 2019-04-19 RX ADMIN — SIMETHICONE CHEW TAB 80 MG 80 MG: 80 TABLET ORAL at 09:15

## 2019-04-19 RX ADMIN — PANTOPRAZOLE SODIUM 40 MG: 40 TABLET, DELAYED RELEASE ORAL at 05:54

## 2019-04-19 RX ADMIN — SIMETHICONE CHEW TAB 80 MG 80 MG: 80 TABLET ORAL at 05:54

## 2019-04-19 NOTE — PLAN OF CARE
Problem: Patient Care Overview  Goal: Plan of Care Review  Outcome: Ongoing (interventions implemented as appropriate)   04/19/19 0438   Coping/Psychosocial   Plan of Care Reviewed With patient   Plan of Care Review   Progress improving   OTHER   Outcome Summary abd dressing C,D,I. states voiding without diff. states scant vag bleeding only. amb in walls. scheduled motrin and prn lortab for pain . IS encouraged.  Had BM yesterday.      Goal: Individualization and Mutuality  Outcome: Ongoing (interventions implemented as appropriate)    Goal: Discharge Needs Assessment  Outcome: Ongoing (interventions implemented as appropriate)    Goal: Interprofessional Rounds/Family Conf  Outcome: Ongoing (interventions implemented as appropriate)      Problem: Hysterectomy (Adult)  Goal: Signs and Symptoms of Listed Potential Problems Will be Absent, Minimized or Managed (Hysterectomy)  Outcome: Ongoing (interventions implemented as appropriate)      Problem: Fall Risk (Adult)  Goal: Absence of Fall  Outcome: Ongoing (interventions implemented as appropriate)

## 2019-04-19 NOTE — PROGRESS NOTES
Chief complaint: Postoperative day 2 status post abdominal supracervical hysterectomy and right oophorectomy  Subjective: Patient with no complaints.  No events overnight.  Tolerating regular diet.  Passing flatus and had a bowel movement last night.  She is ambulating without difficulty.  Pain is well controlled.  She desires to be discharged home.    O:  Vitals:    04/18/19 1753 04/18/19 2212 04/19/19 0217 04/19/19 0449   BP: 106/67 99/61 104/62 91/53   BP Location: Left arm Left arm Left arm Left arm   Patient Position: Sitting Lying Lying Lying   Pulse: 76 74 72 66   Resp: 16 16 16 16   Temp: 97.5 °F (36.4 °C) 98.1 °F (36.7 °C) 97.7 °F (36.5 °C) 98.8 °F (37.1 °C)   TempSrc: Oral Oral Oral Oral   SpO2: 93% 93% 94% 93%   Weight:       Height:           General: No acute distress, awake and oriented ×3  Lungs: Clear to auscultation bilaterally  Cardiovascular: Regular rate and rhythm  Abdomen: Soft, nontender, nondistended, normoactive bowel sounds  Incision: Clean, dry, and intact with SUTURES  Extremities: No Tenderness, no Homans sign, NO lower extremity edema    Results from last 7 days   Lab Units 04/18/19  0604   SODIUM mmol/L 140   BUN mg/dL 11   CREATININE mg/dL 0.73   GLUCOSE mg/dL 173*         Estimated Creatinine Clearance: 101 mL/min (by C-G formula based on SCr of 0.73 mg/dL).  Results from last 7 days   Lab Units 04/18/19  0604   WBC 10*3/mm3 14.41*   HEMOGLOBIN g/dL 11.1*   PLATELETS 10*3/mm3 274               Current Facility-Administered Medications:   •  diphenhydrAMINE (BENADRYL) capsule 50 mg, 50 mg, Oral, Q6H PRN, Curtis Ventura MD  •  docusate sodium (COLACE) capsule 100 mg, 100 mg, Oral, BID, Curtis Ventura MD, 100 mg at 04/19/19 0915  •  escitalopram (LEXAPRO) tablet 10 mg, 10 mg, Oral, Daily, Curtis Ventura MD, 10 mg at 04/19/19 0917  •  HYDROcodone-acetaminophen (NORCO)  MG per tablet 1 tablet, 1 tablet, Oral, Q6H PRN, Leonides Schneider  MD Hardik, 1 tablet at 19 0915  •  ibuprofen (ADVIL,MOTRIN) tablet 800 mg, 800 mg, Oral, Q8H, Curtis Ventura MD, 800 mg at 19 0554  •  lactated ringers infusion, 100 mL/hr, Intravenous, Continuous, Curtis Ventura MD, Stopped at 19 1005  •  ondansetron (ZOFRAN) tablet 4 mg, 4 mg, Oral, Q6H PRN **OR** ondansetron (ZOFRAN) injection 4 mg, 4 mg, Intravenous, Q6H PRN, Curtis Ventura MD  •  oxyCODONE-acetaminophen (PERCOCET)  MG per tablet 1 tablet, 1 tablet, Oral, Q4H PRN, Curtis Ventura MD, 1 tablet at 19 1202  •  oxyCODONE-acetaminophen (PERCOCET) 5-325 MG per tablet 1 tablet, 1 tablet, Oral, Q4H PRN, Curtis Ventura MD  •  pantoprazole (PROTONIX) EC tablet 40 mg, 40 mg, Oral, QAM, Curtis Ventura MD, 40 mg at 19 0554  •  simethicone (MYLICON) chewable tablet 80 mg, 80 mg, Oral, 4x Daily PRN, Curtis Ventura MD, 80 mg at 19 0915        Assessment:  1.  43-year-old  4 para 3 status post abdominal supracervical hysterectomy, bilateral salpingectomy, right oophorectomy, postop day #2, doing well  2.  Pathology revealed multiple fibroids and a right dermoid cyst    Plan:  1.  Patient is meeting all milestones for discharge home.  Extensive discharge instructions given to the patient who verbalized understanding.  She should see me in the office in 1 week.  Patient is advised to call the office to set up this appointment.  Wound care was discussed with the patient.  2.  Surgical findings and final pathology results were also discussed with the patient.  All questions answered.

## 2019-04-19 NOTE — DISCHARGE SUMMARY
Date of Discharge:  4/19/2019    Discharge Diagnosis:   1.  Status post abdominal supracervical hysterectomy, bilateral salpingectomy, right oophorectomy, postop day #2, doing well    Presenting Problem/History of Present Illness  Active Hospital Problems    Diagnosis  POA   • **Intramural and subserous leiomyoma of uterus [D25.1, D25.2]  Unknown   • Fibroids [D21.9]  Yes   • Menorrhagia with regular cycle [N92.0]  Yes   • Dysmenorrhea [N94.6]  Yes      Resolved Hospital Problems   No resolved problems to display.          Hospital Course  Patient is a 43 y.o. female presented with a history of heavy and painful.  Secondary to large uterine fibroids.  She desires definitive surgical therapy.  Surgery was uncomplicated.  Her postoperative course was uncomplicated.  Postop day #2, she was meeting all milestones for discharge.      Procedures Performed    Procedure(s):  ABDOMINAL SUPRA CERVICAL HYSTERECTOMY BILATERAL SALPINGECTOMY RIGHT OOPHERECTOMY CYSTOSCOPY  -------------------       Consults:   Consults     No orders found from 3/19/2019 to 4/18/2019.          Pertinent Test Results:   Results from last 7 days   Lab Units 04/18/19  0604   SODIUM mmol/L 140   BUN mg/dL 11   CREATININE mg/dL 0.73   GLUCOSE mg/dL 173*         Estimated Creatinine Clearance: 101 mL/min (by C-G formula based on SCr of 0.73 mg/dL).  Results from last 7 days   Lab Units 04/18/19  0604   WBC 10*3/mm3 14.41*   HEMOGLOBIN g/dL 11.1*   PLATELETS 10*3/mm3 274                 Condition on Discharge:  good    Vital Signs  Temp:  [97.5 °F (36.4 °C)-98.8 °F (37.1 °C)] 98.8 °F (37.1 °C)  Heart Rate:  [66-84] 66  Resp:  [16] 16  BP: ()/(53-70) 91/53    Physical Exam:   See progress notes    Discharge Disposition  Home or Self Care    Discharge Medications     Discharge Medications      New Medications      Instructions Start Date   HYDROcodone-acetaminophen 5-325 MG per tablet  Commonly known as:  NORCO   1-2 tablets, Oral, Every 6 Hours  PRN      ibuprofen 800 MG tablet  Commonly known as:  ADVIL,MOTRIN   800 mg, Oral, Every 8 Hours PRN         Continue These Medications      Instructions Start Date   Biotin 300 MCG tablet   1 tablet, Oral, Daily, 4/12/19-LAST DOSE      escitalopram 10 MG tablet  Commonly known as:  LEXAPRO   10 mg, Oral, Daily      loteprednol 0.5 % ophthalmic suspension  Commonly known as:  LOTEMAX   4 drops, Both Eyes, Daily, X 7 DAYS       omeprazole 40 MG capsule  Commonly known as:  priLOSEC   40 mg, Oral, Daily             Discharge Diet:   Diet Instructions     Diet: Regular      Discharge Diet:  Regular          Activity at Discharge:  Gradually resume light activities over the next 1-2 weeks.  No heavy lifting over 10 pounds for the next 6 weeks.  You may do a normal amount of walking around the house.  You may go up and down stairs as needed.  No strenuous exercise or strenuous activities for the next 6 weeks.  You may resume driving when you're not in any pain or taking narcotic pain medication.  This typically last 1-2 weeks, but may be longer for some patients.  It is normal to have light vaginal bleeding for up to 6 weeks after surgery.  You may shower.  No submerging in a bathtub for 2 weeks.  Do not resume sexual intercourse for 6 weeks.  Call the office or return to the hospital for temperature greater than 100.5°F, heavy vaginal bleeding soaking a pad an hour, severe abdominal or vaginal pain not improved with pain medication, persistent nausea and vomiting, wound breakdown, or any other medical concerns.  Follow-up with your OB/GYN in 1 week.  Please call the office to make an appointment.      Follow-up Appointments  Future Appointments   Date Time Provider Department Center   5/1/2019  1:00 PM Curtis Ventura MD [facility] None     Additional Instructions for the Follow-ups that You Need to Schedule     Discharge Follow-up with Specified Provider: DR. Ventura; 1 Week   As directed      To:    Lorenzo    Follow Up:  1 Week               Test Results Pending at Discharge       Curtis Ventura MD  04/19/19  9:51 AM

## 2019-04-19 NOTE — PROGRESS NOTES
Discharge Planning Assessment  [facility]     Patient Name: Candi Valerio  MRN: [id number]  Today's Date: 4/19/2019    Admit Date: 4/17/2019    Discharge Needs Assessment    No documentation.       Discharge Plan     Row Name 04/19/19 1058       Plan    Final Discharge Disposition Code  01 - home or self-care       Anna Goode RN

## 2019-04-26 ENCOUNTER — OFFICE VISIT (OUTPATIENT)
Dept: OBSTETRICS AND GYNECOLOGY | Facility: CLINIC | Age: 44
End: 2019-04-26

## 2019-04-26 VITALS
SYSTOLIC BLOOD PRESSURE: 111 MMHG | HEART RATE: 73 BPM | DIASTOLIC BLOOD PRESSURE: 63 MMHG | HEIGHT: 64 IN | BODY MASS INDEX: 29.37 KG/M2 | WEIGHT: 172 LBS

## 2019-04-26 DIAGNOSIS — Z09 POSTOPERATIVE EXAMINATION: Primary | ICD-10-CM

## 2019-04-26 PROCEDURE — 99024 POSTOP FOLLOW-UP VISIT: CPT | Performed by: OBSTETRICS & GYNECOLOGY

## 2019-04-26 NOTE — PROGRESS NOTES
Subjective   Candi Valerio is a 43 y.o. female.   Cc: pt here for po.  History of Present Illness   Patient is here for postoperative appointment.  She is 9 days status post abdominal supracervical hysterectomy.  She has been doing well.  Pain is been well controlled.  She has normal bowel and bladder function.  She is tolerating a regular diet.  She denies any bleeding.  No problems with the incision.    Current Outpatient Medications on File Prior to Visit   Medication Sig   • Biotin 300 MCG tablet Take 1 tablet by mouth Daily. 4/12/19-LAST DOSE   • escitalopram (LEXAPRO) 10 MG tablet Take 10 mg by mouth Daily.   • HYDROcodone-acetaminophen (NORCO) 5-325 MG per tablet Take 1-2 tablets by mouth Every 6 (Six) Hours As Needed for Moderate Pain  or Severe Pain  for up to 7 days.   • ibuprofen (ADVIL,MOTRIN) 800 MG tablet Take 1 tablet by mouth Every 8 (Eight) Hours As Needed for Mild Pain .   • loteprednol (LOTEMAX) 0.5 % ophthalmic suspension Administer 4 drops to both eyes Daily. X 7 DAYS   • omeprazole (priLOSEC) 40 MG capsule Take 40 mg by mouth Daily.     No current facility-administered medications on file prior to visit.      Allergies   Allergen Reactions   • Codeine Swelling   • Adhesive Tape Rash       The following portions of the patient's history were reviewed and updated as appropriate: allergies, current medications, past family history, past medical history, past social history, past surgical history and problem list.    Review of Systems  General: No fever or chills  Constitutional: No weight loss or gain, no hair loss  HENT: No headache, no hearing loss, no tinnitus  Eyes: normal vision, no eye pain  Lungs: No cough, no shortness of breath  Heart: No chest pain, no palpitations  Abdomen: No nausea, vomiting, constipation or diarrhea  : No dysuria, no hematuria  Skin: No rashes  Lymph: No swelling  Neuro: No parathesia, no weakness  Psych: Normal though content, no hallucinations, no  "SI/HI      Objective   Physical Exam  Vitals:    04/26/19 0806   BP: 111/63   Pulse: 73   Weight: 78 kg (172 lb)   Height: 162.6 cm (64\")     General: No acute distress, awake and oriented ×3  Abdomen: Soft, nontender, nondistended, normoactive bowel sounds  Incision: Clean, dry, and intact with sutures  Extremities: No Tenderness, no Homans sign, no lower extremity edema    Assessment/Plan   Diagnoses and all orders for this visit:    Postoperative examination    Patient may resume light activities when she is 2 weeks status post surgery.  She may resume driving as long as she is not hurting and not taking any narcotic pain medication.  Advised to maintain pelvic rest, no heavy lifting, no strenuous activity until roughly 6 weeks postop.  Surgical findings and pathologic findings were discussed with patient.  We discussed the supracervical approach to her surgery and the need for continued Pap smears in the future.  She verbalized understanding.  Return to the office in 4 weeks for postoperative exam.           "

## 2019-05-16 ENCOUNTER — OFFICE VISIT (OUTPATIENT)
Dept: OBSTETRICS AND GYNECOLOGY | Facility: CLINIC | Age: 44
End: 2019-05-16

## 2019-05-16 VITALS
SYSTOLIC BLOOD PRESSURE: 104 MMHG | HEIGHT: 64 IN | HEART RATE: 72 BPM | BODY MASS INDEX: 29.71 KG/M2 | WEIGHT: 174 LBS | DIASTOLIC BLOOD PRESSURE: 65 MMHG

## 2019-05-16 DIAGNOSIS — G89.18 POSTOPERATIVE LOWER ABDOMINAL PAIN: Primary | ICD-10-CM

## 2019-05-16 DIAGNOSIS — R10.30 POSTOPERATIVE LOWER ABDOMINAL PAIN: Primary | ICD-10-CM

## 2019-05-16 PROCEDURE — 99024 POSTOP FOLLOW-UP VISIT: CPT | Performed by: OBSTETRICS & GYNECOLOGY

## 2019-05-16 RX ORDER — GABAPENTIN 100 MG/1
100 CAPSULE ORAL 3 TIMES DAILY
Qty: 21 CAPSULE | Refills: 0 | Status: SHIPPED | OUTPATIENT
Start: 2019-05-16 | End: 2019-05-23 | Stop reason: SDUPTHER

## 2019-05-16 NOTE — PROGRESS NOTES
Subjective   Candi Valerio is a 43 y.o. female.   Cc: pt here for po burning in abdomen.   History of Present Illness   Patient is here about 4 weeks status post abdominal supracervical hysterectomy.  She reports of burning and painful sensation in the lower aspect of her abdomen, basically from the umbilicus to just above the incision.  She denies any redness or rash on the abdomen.  She reports that the abdomen is painful to just light touch.  She says this is been ongoing for about 1-2 weeks.  She denies any problems with bowel or bladder function.  She denies any vaginal discharge.  A few days ago she had a scant amount of spotting, but this has resolved as well.  She denies fevers and chills.  She says in general she feels like she has been recovering well from the surgery.    Current Outpatient Medications on File Prior to Visit   Medication Sig   • Biotin 300 MCG tablet Take 1 tablet by mouth Daily. 4/12/19-LAST DOSE   • escitalopram (LEXAPRO) 10 MG tablet Take 10 mg by mouth Daily.   • omeprazole (priLOSEC) 40 MG capsule Take 40 mg by mouth Daily.   • [DISCONTINUED] ibuprofen (ADVIL,MOTRIN) 800 MG tablet Take 1 tablet by mouth Every 8 (Eight) Hours As Needed for Mild Pain .   • [DISCONTINUED] loteprednol (LOTEMAX) 0.5 % ophthalmic suspension Administer 4 drops to both eyes Daily. X 7 DAYS     No current facility-administered medications on file prior to visit.        Social History     Substance and Sexual Activity   Alcohol Use Yes   • Frequency: Never    Comment: OCC   ;  The following portions of the patient's history were reviewed and updated as appropriate: allergies, current medications, past family history, past medical history, past social history, past surgical history and problem list.    Review of Systems  General: No fever or chills  Constitutional: No weight loss or gain, no hair loss  HENT: No headache, no hearing loss, no tinnitus  Eyes: normal vision, no eye pain  Lungs: No cough, no shortness  "of breath  Heart: No chest pain, no palpitations  Abdomen: No nausea, vomiting, constipation or diarrhea  : No dysuria, no hematuria  Skin: No rashes  Lymph: No swelling  Neuro: No parathesia, no weakness  Psych: Normal though content, no hallucinations, no SI/HI      Objective   Physical Exam  Vitals:    05/16/19 0821   BP: 104/65   Pulse: 72   Weight: 78.9 kg (174 lb)   Height: 162.6 cm (64\")     General: No acute distress, awake and oriented x3  Abdomen: Soft, nontender, nondistended, no rebound or guarding.  The patient does seem to be sensitive to light touch over the lower aspect of her abdomen, but deep palpation is completely nonpainful  Incision: Well-healed with no erythema or drainage.  No signs of infection  Psychiatric: Good judgment and insight, normal affect and mood  Neurologic: Cranial nerves II through XII intact, no deficits    Assessment/Plan   Diagnoses and all orders for this visit:    Postoperative lower abdominal pain  -     gabapentin (NEURONTIN) 100 MG capsule; Take 1 capsule by mouth 3 (Three) Times a Day.      I do not feel that the patient's pain is related to any type of infection or intra-abdominal process.  Her abdominal exam is rather benign today.  Given her description of the pain is a burning sensation, especially just to light touch, I feel this may be related to a neuropathic origin.  Patient may have some spontaneous nerve regeneration following her Pfannenstiel incision, which may be giving this pain sensation.  I have discussed these findings with the patient and my thoughts as to what is causing the pain.  One therapeutic option may be a short course of something such as gabapentin to try to help with possible postoperative neuropathic pain.  We discussed the risks of this medication.  Avoid driving or operating machinery with this medication as it may cause drowsiness.  She has an appointment with me in 1 week.  We can try this medication over the next week to see if this " helps with her pain, and then decide whether she would like to continue it for a period of time.

## 2019-05-23 ENCOUNTER — OFFICE VISIT (OUTPATIENT)
Dept: OBSTETRICS AND GYNECOLOGY | Facility: CLINIC | Age: 44
End: 2019-05-23

## 2019-05-23 VITALS
HEART RATE: 76 BPM | DIASTOLIC BLOOD PRESSURE: 68 MMHG | HEIGHT: 64 IN | SYSTOLIC BLOOD PRESSURE: 110 MMHG | BODY MASS INDEX: 29.37 KG/M2 | WEIGHT: 172 LBS

## 2019-05-23 DIAGNOSIS — G89.18 POSTOPERATIVE LOWER ABDOMINAL PAIN: ICD-10-CM

## 2019-05-23 DIAGNOSIS — R10.30 POSTOPERATIVE LOWER ABDOMINAL PAIN: ICD-10-CM

## 2019-05-23 PROCEDURE — 99024 POSTOP FOLLOW-UP VISIT: CPT | Performed by: OBSTETRICS & GYNECOLOGY

## 2019-05-23 RX ORDER — GABAPENTIN 100 MG/1
100 CAPSULE ORAL 3 TIMES DAILY PRN
Qty: 42 CAPSULE | Refills: 0 | Status: SHIPPED | OUTPATIENT
Start: 2019-05-23 | End: 2019-06-20

## 2019-05-23 NOTE — PROGRESS NOTES
"Joel Valerio is a 43 y.o. female.   Cc: pt here for po.  History of Present Illness   Patient is just over 5 weeks status post abdominal supracervical hysterectomy.  She is generally doing well.  She reports that the pain that she was experiencing last week with burning pains on her abdomen have been improved.  She is taking the Neurontin intermittently, but she does feel that it helps.  She is otherwise doing well.  No vaginal bleeding.    Current Outpatient Medications on File Prior to Visit   Medication Sig   • Biotin 300 MCG tablet Take 1 tablet by mouth Daily. 4/12/19-LAST DOSE   • escitalopram (LEXAPRO) 10 MG tablet Take 10 mg by mouth Daily.   • omeprazole (priLOSEC) 40 MG capsule Take 40 mg by mouth Daily.   • [DISCONTINUED] gabapentin (NEURONTIN) 100 MG capsule Take 1 capsule by mouth 3 (Three) Times a Day.     No current facility-administered medications on file prior to visit.      Allergies   Allergen Reactions   • Codeine Swelling   • Adhesive Tape Rash       The following portions of the patient's history were reviewed and updated as appropriate: allergies, current medications, past family history, past medical history, past social history, past surgical history and problem list.    Review of Systems  General: No fever or chills  Abdomen: No nausea, vomiting, constipation or diarrhea  : No dysuria, no hematuria  Skin: No rashes  Lymph: No swelling  Neuro: No parathesia, no weakness  Psych: Normal though content, no hallucinations, no SI/HI      Objective   Physical Exam  Vitals:    05/23/19 0810   BP: 110/68   Pulse: 76   Weight: 78 kg (172 lb)   Height: 162.6 cm (64\")     General: No acute distress, awake and oriented x3  Abdomen: Soft, nontender, nondistended, no hernias or masses  Incision: Well-healed with no erythema or drainage  Psychiatric: Good judgment insight, normal affect mood    Assessment/Plan   Diagnoses and all orders for this visit:    Postoperative lower abdominal " pain  -     gabapentin (NEURONTIN) 100 MG capsule; Take 1 capsule by mouth 3 (Three) Times a Day As Needed (abdominal pain) for up to 28 days.    Patient is doing well overall.  She may resume normal activities.  Again, I feel that this burning pain in her abdomen is likely related to postsurgical neuropathic pain.  It does seem to be improving with time and with the medication.  I expect this improvement will continue for her.  I discussed with the patient that in the event that the pain does not resolve spontaneously, there are alternative modalities that try to help manage the pain, such as with continued use of Neurontin or other similar medications, physical therapy such as with myofascial injections, dry needling, etc. she verbalizes understanding of this.  As for the use of Neurontin now, I will recommend that she try to taper off of it in the next 2-4 weeks and to see if long-term usage is necessary.  We discussed the need for continued routine Pap smear testing because of her supracervical hysterectomy.  We discussed the need for regular yearly exams.  She verbalized understanding.  Follow-up in 1 year

## 2020-12-22 ENCOUNTER — OFFICE VISIT (OUTPATIENT)
Dept: OBSTETRICS AND GYNECOLOGY | Facility: CLINIC | Age: 45
End: 2020-12-22

## 2020-12-22 VITALS
HEIGHT: 64 IN | DIASTOLIC BLOOD PRESSURE: 80 MMHG | WEIGHT: 172 LBS | BODY MASS INDEX: 29.37 KG/M2 | SYSTOLIC BLOOD PRESSURE: 120 MMHG

## 2020-12-22 DIAGNOSIS — R10.2 PELVIC PAIN: ICD-10-CM

## 2020-12-22 DIAGNOSIS — R31.9 HEMATURIA, UNSPECIFIED TYPE: ICD-10-CM

## 2020-12-22 DIAGNOSIS — N93.9 VAGINAL BLEEDING: Primary | ICD-10-CM

## 2020-12-22 LAB
BILIRUB BLD-MCNC: ABNORMAL MG/DL
CLARITY, POC: CLEAR
COLOR UR: YELLOW
GLUCOSE UR STRIP-MCNC: NEGATIVE MG/DL
KETONES UR QL: NEGATIVE
LEUKOCYTE EST, POC: NEGATIVE
NITRITE UR-MCNC: NEGATIVE MG/ML
PH UR: 5.5 [PH] (ref 5–8)
PROT UR STRIP-MCNC: ABNORMAL MG/DL
RBC # UR STRIP: ABNORMAL /UL
SP GR UR: 1.03 (ref 1–1.03)
UROBILINOGEN UR QL: NORMAL

## 2020-12-22 PROCEDURE — 99214 OFFICE O/P EST MOD 30 MIN: CPT | Performed by: NURSE PRACTITIONER

## 2020-12-22 PROCEDURE — 81002 URINALYSIS NONAUTO W/O SCOPE: CPT | Performed by: NURSE PRACTITIONER

## 2020-12-22 RX ORDER — NITROFURANTOIN 25; 75 MG/1; MG/1
100 CAPSULE ORAL 2 TIMES DAILY
Qty: 6 CAPSULE | Refills: 0 | Status: SHIPPED | OUTPATIENT
Start: 2020-12-22 | End: 2021-03-30 | Stop reason: SDUPTHER

## 2020-12-22 RX ORDER — ALBUTEROL SULFATE 90 UG/1
AEROSOL, METERED RESPIRATORY (INHALATION)
COMMUNITY
Start: 2020-11-09 | End: 2021-03-30 | Stop reason: SDUPTHER

## 2020-12-23 LAB
BACTERIA UR CULT: NO GROWTH
BACTERIA UR CULT: NORMAL

## 2020-12-25 LAB
A VAGINAE DNA VAG QL NAA+PROBE: NORMAL SCORE
BVAB2 DNA VAG QL NAA+PROBE: NORMAL SCORE
C ALBICANS DNA VAG QL NAA+PROBE: NEGATIVE
C GLABRATA DNA VAG QL NAA+PROBE: NEGATIVE
C TRACH DNA VAG QL NAA+PROBE: NEGATIVE
MEGA1 DNA VAG QL NAA+PROBE: NORMAL SCORE
N GONORRHOEA DNA VAG QL NAA+PROBE: NEGATIVE
T VAGINALIS DNA VAG QL NAA+PROBE: NEGATIVE

## 2021-03-30 ENCOUNTER — OFFICE VISIT (OUTPATIENT)
Dept: OBSTETRICS AND GYNECOLOGY | Facility: CLINIC | Age: 46
End: 2021-03-30

## 2021-03-30 VITALS
HEIGHT: 64 IN | BODY MASS INDEX: 29.37 KG/M2 | SYSTOLIC BLOOD PRESSURE: 119 MMHG | WEIGHT: 172 LBS | DIASTOLIC BLOOD PRESSURE: 78 MMHG

## 2021-03-30 DIAGNOSIS — N83.201 RIGHT OVARIAN CYST: Primary | ICD-10-CM

## 2021-03-30 PROCEDURE — 99212 OFFICE O/P EST SF 10 MIN: CPT | Performed by: NURSE PRACTITIONER

## 2021-03-30 RX ORDER — ESCITALOPRAM OXALATE 20 MG/1
20 TABLET ORAL DAILY
COMMUNITY
Start: 2020-12-30

## 2021-03-30 NOTE — PROGRESS NOTES
"Chief Complaint   Patient presents with   • Follow-up     repeat US for ovarian cyst        SUBJECTIVE:     Candi Valerio is a 45 y.o.  who presents with for repeat ultrasound, hx of right ovarian cyst. She has no complaints today. Prior partial hysterectomy with right ovary remaining.       Past Medical History:   Diagnosis Date   • Abnormal menses     WITH MULTIPLE FIBROIDS   • Abnormal Pap smear of cervix    • Anxiety    • Sleep apnea     NO C-PAP IN USE-\"VERY MILD CASE\"      Past Surgical History:   Procedure Laterality Date   • D & C WITH SUCTION     • INCONTINENCE SURGERY     • TOTAL ABDOMINAL HYSTERECTOMY N/A 2019    Procedure: ABDOMINAL SUPRA CERVICAL HYSTERECTOMY BILATERAL SALPINGECTOMY RIGHT OOPHERECTOMY CYSTOSCOPY;  Surgeon: Curtis Ventura MD;  Location: [facility];  Service: Obstetrics/Gynecology   • TUBAL ABDOMINAL LIGATION        Social History     Tobacco Use   • Smoking status: Current Every Day Smoker     Packs/day: 1.00     Years: 25.00     Pack years: 25.00     Types: Cigarettes   • Smokeless tobacco: Never Used   Substance Use Topics   • Alcohol use: Yes     Comment: OCC   • Drug use: No     OB History    Para Term  AB Living   4 3   3 1 3   SAB TAB Ectopic Molar Multiple Live Births   1                # Outcome Date GA Lbr Israel/2nd Weight Sex Delivery Anes PTL Lv   4 SAB            3   35w0d   F       2   35w0d   F       1   35w0d   M Vag-Spont               OBJECTIVE:   Vitals:    21 1412   BP: 119/78   Weight: 78 kg (172 lb)   Height: 162.6 cm (64\")            ASSESSMENT:   1) Right ovarian cyst    PLAN:   Reviewed ultrasound results, cyst now resolved  Encouraged to schedule mammogram screening, report annual gyn exam Dec. 2021    Follow up:PRN and annually    Nelly Duke, APRN  3/30/2021  14:18 EDT  "

## (undated) DEVICE — PREMIUM WET SKIN PREP TRAY: Brand: MEDLINE INDUSTRIES, INC.

## (undated) DEVICE — DEV OPN LIGASURE CRV 180D 36MM 13.5CM  1P/U

## (undated) DEVICE — DRSNG TELFA PAD NONADH STR 1S 3X8IN

## (undated) DEVICE — PK HYST ABD 40

## (undated) DEVICE — TAPE MICROFM 2IN LF

## (undated) DEVICE — ANTIBACTERIAL UNDYED BRAIDED (POLYGLACTIN 910), SYNTHETIC ABSORBABLE SUTURE: Brand: COATED VICRYL

## (undated) DEVICE — PAD SANI MAXI W/ADHS SNG WRP 11IN

## (undated) DEVICE — SUT VIC 0 CT1 36IN J946H

## (undated) DEVICE — ADHS SKIN DERMABOND TOP ADVANCED

## (undated) DEVICE — PAD,ABDOMINAL,8"X10",ST,LF: Brand: MEDLINE

## (undated) DEVICE — PAD,ABDOMINAL,8"X7.5",STERILE,LF,1/PK: Brand: MEDLINE

## (undated) DEVICE — SUT VIC 0 TIES 18IN J912G

## (undated) DEVICE — GOWN,NON-REINFORCED,SIRUS,SET IN SLV,XL: Brand: MEDLINE

## (undated) DEVICE — SUT VIC 0 CT1 CR8 18IN DYED J740D

## (undated) DEVICE — ST IRR CYSTO W/SPK 77IN LF

## (undated) DEVICE — UNDYED BRAIDED (POLYGLACTIN 910), SYNTHETIC ABSORBABLE SUTURE: Brand: COATED VICRYL

## (undated) DEVICE — TOTAL TRAY, 16FR 10ML SIL FOLEY, URN: Brand: MEDLINE

## (undated) DEVICE — SUT  GUT PLAIN 2/0 CT1 27IN 843H